# Patient Record
Sex: MALE | Race: WHITE | NOT HISPANIC OR LATINO | Employment: OTHER | URBAN - METROPOLITAN AREA
[De-identification: names, ages, dates, MRNs, and addresses within clinical notes are randomized per-mention and may not be internally consistent; named-entity substitution may affect disease eponyms.]

---

## 2020-11-02 NOTE — PROGRESS NOTES
CRS Office Visit History and Physical    Referring MD:   John Todd Md  [address]  [place] [address]    SUBJECTIVE:     Chief Complaint: Colon polyp    History of Present Illness:  The patient is new patient to this practice.   Course is as follows:  Patient is a 59 y.o. male presents for management of unresectable ascending colon polyp.  Presented initially with + Cologuard, underwent colonoscopy July 2019 which was remarkable for 3cm and 1cm ascending colon polyps, both TVAs, not removed.  Was then referred to [facility], where he has undergone 2 attempted endoscopic removals (both >3hrs per wife, both failed) on Oct 2019 and Feb 2020.  I do not yet have records from these.  He was told that pathoogy from these procedures was also benign.  Was then referred for surgery, which was cancelled because of COVID.    History of previous planned trach for sleep apnea surgery in 2000s.  History of open RYGB in 2000 (no significant complications per pt).  History of lap oneil (2005), at that procedure an 'artery was cut' and he received 6u of blood.  History of open G-tube for dysplagia related to C-spine fracture after fall.  Also takes in what sounds like full liquid diet in addition to night-time feeds.  Fall also complicated by chronic hypotension (on Midodrine), aspiration event resulting in what sounds like a code, and CKD.  Aside from above, he is relatively functional.  Enjoys gardening, needs to take breaks.  Lives with his wife of 38 years.    Last Colonoscopy:   7/1/2019  6mm polyp, sessile, transverse colon.  Removed completely w hot snare. --> Tubulovillous adenoma  30mm ascending colon polyp, sessile, biopsied and tattoed.  --> Tubulovillous adenoma, no dysplasia.  10mm ascending colon polyp. Not sampled.  Inadequate bowel prep    Family History of Colon Cancer / IBD: Multiple family members with various cancers, none with colon.    Review of patient's allergies indicates:   Allergen  "Reactions    Benefiber (wheat dextrin) [wheat dextrin] Rash       History reviewed. No pertinent past medical history.  History reviewed. No pertinent surgical history.  History reviewed. No pertinent family history.  Social History     Tobacco Use    Smoking status: Not on file   Substance Use Topics    Alcohol use: Not on file    Drug use: Not on file        Review of Systems:  Review of Systems   Gastrointestinal: Positive for constipation. Negative for abdominal pain, blood in stool and diarrhea.   All other systems reviewed and are negative.      OBJECTIVE:     Vital Signs (Most Recent)  /85 (BP Location: Left arm, Patient Position: Sitting, BP Method: Large (Automatic))   Pulse 75   Ht 6' 2" (1.88 m)   Wt 84.3 kg (185 lb 13.6 oz)   BMI 23.86 kg/m²     Physical Exam:  General: Data Unavailable male in no distress   Neuro: Alert and oriented x 4.  Moves all extremities.     HEENT: No icterus.  Trachea midline  Respiratory: Respirations are even and unlabored  Cardiac: Regular rate  Abdomen: Soft, non-distended, well-healed upper midline incision, LUQ G-tube in place.  Extremities: Warm dry and intact  Skin: No rashes      ASSESSMENT/PLAN:     58yo M w/ unresectable ascending colon tubulovillous adenoma, complex past medical and surgical history.    Will need to obtain endoscopy and pathology records from [facility].  Assuming no new findings, I would agree with proceeding with a laparoscopic, likely open, right colectomy.  We reviewed this procedure using visual aids.  We discussed risks including: anastomotic leak, abscess, wound infection, damage to bowel or ureter, bleeding, prolonged intubation, aspiration, cardiac event, brain death, or death. He will need to be seen in Anesthesia Pre-Op clinic prior to surgery.  Will also plan to obtain a CT abdomen/pelvis for surgical planning. Discussed anticipated post-op recovery. Asked if he had any additional questions, none at this time.     Negin SANCHEZ" MD Radha  Staff Surgeon  Colon & Rectal Surgery

## 2020-11-04 ENCOUNTER — OFFICE VISIT (OUTPATIENT)
Dept: SURGERY | Facility: CLINIC | Age: 60
End: 2020-11-04
Attending: COLON & RECTAL SURGERY
Payer: MEDICARE

## 2020-11-04 VITALS
SYSTOLIC BLOOD PRESSURE: 124 MMHG | HEIGHT: 74 IN | WEIGHT: 185.88 LBS | BODY MASS INDEX: 23.85 KG/M2 | DIASTOLIC BLOOD PRESSURE: 85 MMHG | HEART RATE: 75 BPM

## 2020-11-04 DIAGNOSIS — K63.5 POLYP OF COLON, UNSPECIFIED PART OF COLON, UNSPECIFIED TYPE: Primary | ICD-10-CM

## 2020-11-04 PROCEDURE — 99999 PR PBB SHADOW E&M-NEW PATIENT-LVL IV: CPT | Mod: PBBFAC,,, | Performed by: COLON & RECTAL SURGERY

## 2020-11-04 PROCEDURE — 3008F BODY MASS INDEX DOCD: CPT | Mod: CPTII,S$GLB,, | Performed by: COLON & RECTAL SURGERY

## 2020-11-04 PROCEDURE — 3008F PR BODY MASS INDEX (BMI) DOCUMENTED: ICD-10-PCS | Mod: CPTII,S$GLB,, | Performed by: COLON & RECTAL SURGERY

## 2020-11-04 PROCEDURE — 99999 PR PBB SHADOW E&M-NEW PATIENT-LVL IV: ICD-10-PCS | Mod: PBBFAC,,, | Performed by: COLON & RECTAL SURGERY

## 2020-11-04 PROCEDURE — 99205 PR OFFICE/OUTPT VISIT, NEW, LEVL V, 60-74 MIN: ICD-10-PCS | Mod: S$GLB,,, | Performed by: COLON & RECTAL SURGERY

## 2020-11-04 PROCEDURE — 99205 OFFICE O/P NEW HI 60 MIN: CPT | Mod: S$GLB,,, | Performed by: COLON & RECTAL SURGERY

## 2020-11-04 RX ORDER — ERGOCALCIFEROL (VITAMIN D2) 200 MCG/ML
8000 DROPS ORAL DAILY
COMMUNITY

## 2020-11-04 RX ORDER — CALCITRIOL 1 UG/ML
3 SOLUTION ORAL DAILY
COMMUNITY

## 2020-11-04 RX ORDER — FERROUS SULFATE 300 MG/5ML
300 LIQUID (ML) ORAL DAILY
COMMUNITY

## 2020-11-04 RX ORDER — GABAPENTIN 600 MG/1
600 TABLET ORAL 3 TIMES DAILY
COMMUNITY

## 2020-11-04 RX ORDER — FLUDROCORTISONE ACETATE 0.1 MG/1
100 TABLET ORAL DAILY
COMMUNITY

## 2020-11-04 RX ORDER — METRONIDAZOLE 500 MG/1
TABLET ORAL
Qty: 3 TABLET | Refills: 0 | Status: ON HOLD | OUTPATIENT
Start: 2020-11-04 | End: 2021-01-05 | Stop reason: HOSPADM

## 2020-11-04 RX ORDER — FERROUS SULFATE 220 (44)/5
220 SOLUTION, ORAL ORAL DAILY
COMMUNITY

## 2020-11-04 RX ORDER — MULTIVITAMIN WITH MINERALS
30 ELIXIR ORAL
COMMUNITY

## 2020-11-04 RX ORDER — AMITRIPTYLINE HYDROCHLORIDE 50 MG/1
150 TABLET, FILM COATED ORAL NIGHTLY
COMMUNITY

## 2020-11-04 RX ORDER — LEVETIRACETAM 100 MG/ML
20 SOLUTION ORAL 2 TIMES DAILY
COMMUNITY

## 2020-11-04 RX ORDER — POLYETHYLENE GLYCOL 3350 17 G/17G
POWDER, FOR SOLUTION ORAL
Qty: 238 G | Refills: 0 | Status: ON HOLD | OUTPATIENT
Start: 2020-11-04 | End: 2021-01-05 | Stop reason: HOSPADM

## 2020-11-04 RX ORDER — NEOMYCIN SULFATE 500 MG/1
TABLET ORAL
Qty: 6 TABLET | Refills: 0 | Status: ON HOLD | OUTPATIENT
Start: 2020-11-04 | End: 2021-01-05 | Stop reason: HOSPADM

## 2020-11-04 RX ORDER — OXYCODONE HCL 10 MG/1
10 TABLET, FILM COATED, EXTENDED RELEASE ORAL EVERY 12 HOURS PRN
COMMUNITY

## 2020-11-04 RX ORDER — CIMETIDINE 300 MG/1
300 TABLET, FILM COATED ORAL 2 TIMES DAILY
COMMUNITY

## 2020-11-04 RX ORDER — BACLOFEN 10 MG/1
TABLET ORAL
COMMUNITY

## 2020-11-04 NOTE — LETTER
November 4, 2020      John Todd MD  [address]  [facility] [address]           [facility]- [address]  [address]  [facility] [address]  Phone: [phone]          Patient: Ifeanyi Bunn   MR Number: [id number]   YOB: 1960   Date of Visit: 11/4/2020       Dear Dr. John Todd:    Thank you for referring Ifeanyi Bunn to me for evaluation. Attached you will find relevant portions of my assessment and plan of care.    If you have questions, please do not hesitate to call me. I look forward to following Ifeanyi Bunn along with you.    Sincerely,    Negin Garcia MD    Enclosure  CC:  No Recipients    If you would like to receive this communication electronically, please contact [email or web address] or [phone] to request more information on [employer] access.    For providers and/or their staff who would like to refer a patient to [facility], please contact us through our one-stop-shop provider referral line, [facility], at [phone].    If you feel you have received this communication in error or would no longer like to receive these types of communications, please e-mail [email or web address]

## 2020-11-10 ENCOUNTER — PATIENT MESSAGE (OUTPATIENT)
Dept: SURGERY | Facility: CLINIC | Age: 60
End: 2020-11-10

## 2020-11-10 ENCOUNTER — TELEPHONE (OUTPATIENT)
Dept: SURGERY | Facility: CLINIC | Age: 60
End: 2020-11-10

## 2020-11-12 ENCOUNTER — TELEPHONE (OUTPATIENT)
Dept: SURGERY | Facility: CLINIC | Age: 60
End: 2020-11-12

## 2020-11-12 ENCOUNTER — PATIENT MESSAGE (OUTPATIENT)
Dept: SURGERY | Facility: HOSPITAL | Age: 60
End: 2020-11-12

## 2020-11-13 ENCOUNTER — PATIENT MESSAGE (OUTPATIENT)
Dept: SURGERY | Facility: HOSPITAL | Age: 60
End: 2020-11-13

## 2020-11-18 ENCOUNTER — PATIENT MESSAGE (OUTPATIENT)
Dept: SURGERY | Facility: HOSPITAL | Age: 60
End: 2020-11-18

## 2020-11-23 ENCOUNTER — HOSPITAL ENCOUNTER (OUTPATIENT)
Dept: RADIOLOGY | Facility: HOSPITAL | Age: 60
Discharge: HOME | End: 2020-11-23
Attending: COLON & RECTAL SURGERY
Payer: MEDICARE

## 2020-11-23 ENCOUNTER — HOSPITAL ENCOUNTER (OUTPATIENT)
Dept: PREADMISSION TESTING | Facility: HOSPITAL | Age: 60
Discharge: HOME | End: 2020-11-23
Attending: ANESTHESIOLOGY
Payer: MEDICARE

## 2020-11-23 ENCOUNTER — HOSPITAL ENCOUNTER (OUTPATIENT)
Dept: CARDIOLOGY | Facility: CLINIC | Age: 60
Discharge: HOME | End: 2020-11-23
Payer: MEDICARE

## 2020-11-23 ENCOUNTER — PATIENT MESSAGE (OUTPATIENT)
Dept: SURGERY | Facility: HOSPITAL | Age: 60
End: 2020-11-23

## 2020-11-23 ENCOUNTER — INITIAL CONSULT (OUTPATIENT)
Dept: INTERNAL MEDICINE | Facility: CLINIC | Age: 60
End: 2020-11-23
Payer: MEDICARE

## 2020-11-23 ENCOUNTER — ANESTHESIA EVENT (OUTPATIENT)
Dept: SURGERY | Facility: HOSPITAL | Age: 60
DRG: 331 | End: 2020-11-23
Payer: MEDICARE

## 2020-11-23 VITALS
HEIGHT: 74 IN | DIASTOLIC BLOOD PRESSURE: 90 MMHG | SYSTOLIC BLOOD PRESSURE: 178 MMHG | OXYGEN SATURATION: 97 % | HEART RATE: 70 BPM | RESPIRATION RATE: 16 BRPM | BODY MASS INDEX: 24.26 KG/M2 | TEMPERATURE: 98 F | WEIGHT: 189 LBS

## 2020-11-23 DIAGNOSIS — G40.909 SEIZURE DISORDER: ICD-10-CM

## 2020-11-23 DIAGNOSIS — R26.89 BALANCE PROBLEM: ICD-10-CM

## 2020-11-23 DIAGNOSIS — Z98.890 H/O TRACHEOSTOMY: ICD-10-CM

## 2020-11-23 DIAGNOSIS — R41.0 CONFUSION: ICD-10-CM

## 2020-11-23 DIAGNOSIS — D64.9 ANEMIA, UNSPECIFIED TYPE: ICD-10-CM

## 2020-11-23 DIAGNOSIS — N18.31 STAGE 3A CHRONIC KIDNEY DISEASE: ICD-10-CM

## 2020-11-23 DIAGNOSIS — Z93.1 PEG (PERCUTANEOUS ENDOSCOPIC GASTROSTOMY) STATUS: ICD-10-CM

## 2020-11-23 DIAGNOSIS — G89.29 OTHER CHRONIC PAIN: ICD-10-CM

## 2020-11-23 DIAGNOSIS — Z86.69 HISTORY OF SLEEP APNEA: ICD-10-CM

## 2020-11-23 DIAGNOSIS — K63.5 POLYP OF COLON, UNSPECIFIED PART OF COLON, UNSPECIFIED TYPE: ICD-10-CM

## 2020-11-23 DIAGNOSIS — Z01.818 PREOPERATIVE TESTING: ICD-10-CM

## 2020-11-23 DIAGNOSIS — Z90.49 HISTORY OF CHOLECYSTECTOMY: ICD-10-CM

## 2020-11-23 DIAGNOSIS — F11.90 CHRONIC, CONTINUOUS USE OF OPIOIDS: ICD-10-CM

## 2020-11-23 DIAGNOSIS — Z82.49 FAMILY HISTORY OF HEART DISEASE: ICD-10-CM

## 2020-11-23 DIAGNOSIS — Z86.73 HISTORY OF STROKE: ICD-10-CM

## 2020-11-23 DIAGNOSIS — F32.A DEPRESSION, UNSPECIFIED DEPRESSION TYPE: ICD-10-CM

## 2020-11-23 DIAGNOSIS — Z98.890 H/O CERVICAL SPINE SURGERY: ICD-10-CM

## 2020-11-23 DIAGNOSIS — R13.10 DYSPHAGIA, UNSPECIFIED TYPE: ICD-10-CM

## 2020-11-23 DIAGNOSIS — I10 ESSENTIAL HYPERTENSION: ICD-10-CM

## 2020-11-23 DIAGNOSIS — Z86.14 HISTORY OF MRSA INFECTION: ICD-10-CM

## 2020-11-23 DIAGNOSIS — Z98.84 HISTORY OF ROUX-EN-Y GASTRIC BYPASS: ICD-10-CM

## 2020-11-23 PROBLEM — N18.30 STAGE 3 CHRONIC KIDNEY DISEASE: Status: ACTIVE | Noted: 2019-08-28

## 2020-11-23 PROBLEM — S12.9XXA: Status: ACTIVE | Noted: 2018-01-29

## 2020-11-23 PROCEDURE — 74177 CT ABD & PELVIS W/CONTRAST: CPT | Mod: 26,,, | Performed by: RADIOLOGY

## 2020-11-23 PROCEDURE — 99204 PR OFFICE/OUTPT VISIT, NEW, LEVL IV, 45-59 MIN: ICD-10-PCS | Mod: S$GLB,,, | Performed by: HOSPITALIST

## 2020-11-23 PROCEDURE — 74177 CT ABD & PELVIS W/CONTRAST: CPT | Mod: TC

## 2020-11-23 PROCEDURE — 93010 EKG 12-LEAD: ICD-10-PCS | Mod: S$GLB,,, | Performed by: INTERNAL MEDICINE

## 2020-11-23 PROCEDURE — 99999 PR PBB SHADOW E&M-EST. PATIENT-LVL III: ICD-10-PCS | Mod: PBBFAC,,, | Performed by: HOSPITALIST

## 2020-11-23 PROCEDURE — 99204 OFFICE O/P NEW MOD 45 MIN: CPT | Mod: S$GLB,,, | Performed by: HOSPITALIST

## 2020-11-23 PROCEDURE — 74177 CT ABDOMEN PELVIS WITH CONTRAST: ICD-10-PCS | Mod: 26,,, | Performed by: RADIOLOGY

## 2020-11-23 PROCEDURE — 93010 ELECTROCARDIOGRAM REPORT: CPT | Mod: S$GLB,,, | Performed by: INTERNAL MEDICINE

## 2020-11-23 PROCEDURE — 93005 EKG 12-LEAD: ICD-10-PCS | Mod: S$GLB,,, | Performed by: ANESTHESIOLOGY

## 2020-11-23 PROCEDURE — 25500020 PHARM REV CODE 255: Performed by: COLON & RECTAL SURGERY

## 2020-11-23 PROCEDURE — 93005 ELECTROCARDIOGRAM TRACING: CPT | Mod: S$GLB,,, | Performed by: ANESTHESIOLOGY

## 2020-11-23 PROCEDURE — 99999 PR PBB SHADOW E&M-EST. PATIENT-LVL III: CPT | Mod: PBBFAC,,, | Performed by: HOSPITALIST

## 2020-11-23 RX ORDER — MUPIROCIN 20 MG/G
OINTMENT TOPICAL 2 TIMES DAILY
Qty: 30 G | Refills: 0 | Status: SHIPPED | OUTPATIENT
Start: 2020-12-01 | End: 2020-11-23 | Stop reason: SDUPTHER

## 2020-11-23 RX ORDER — MUPIROCIN 20 MG/G
OINTMENT TOPICAL 2 TIMES DAILY
Qty: 22 G | Refills: 0 | Status: SHIPPED | OUTPATIENT
Start: 2020-12-01 | End: 2020-12-06

## 2020-11-23 RX ORDER — MIDODRINE HYDROCHLORIDE 5 MG/1
10 TABLET ORAL DAILY PRN
COMMUNITY

## 2020-11-23 RX ORDER — CALCIUM CARBONATE 300MG(750)
TABLET,CHEWABLE ORAL
COMMUNITY

## 2020-11-23 RX ORDER — FLUOXETINE 20 MG/5ML
60 SOLUTION ORAL DAILY
COMMUNITY
Start: 2020-09-09

## 2020-11-23 RX ADMIN — IOHEXOL 100 ML: 350 INJECTION, SOLUTION INTRAVENOUS at 01:11

## 2020-11-23 NOTE — ASSESSMENT & PLAN NOTE
Getting nutrition mostly  through the feeding tube   Formula- vital 1.5  Gets Medication through the feeding tube

## 2020-11-23 NOTE — OUTPATIENT SUBJECTIVE & OBJECTIVE
"Outpatient Subjective & Objective     Chief complaint-Preoperative evaluation, Perioperative Medical management, complication reduction plan     Active cardiac conditions- none    Revised cardiac risk index predictors- history of cerebrovascular disease    Functional capacity -Examples of physical activity, tries to stay active , works some in the garden,   can take a flight of stairs holding on to the railing----- He can undertake all the above activities without  chest pain,chest tightness, Shortness of breath , making his exercise tolerance more, than 4 Mets.       Review of Systems   Constitutional: Negative for chills and fever.        No unusual weight changes     HENT:        STOPBANG score  5/ 8      Napping during the day   Witnessed stopping of breathing   Age over 50 years  Neck size over 40 CM  Male gender   Eyes:        No unusual vision changes   Respiratory:        No cough , phlegm    No Hemoptysis   Cardiovascular:        As noted   Gastrointestinal:        Bowels- constipated  No overt upperGI/ blood losses   Endocrine:        Prednisone use > 20 mg daily for 3 weeks- none    Genitourinary: Negative for dysuria.        No urinary hesitancy    Musculoskeletal:        As above      Skin: Negative for rash.   Neurological: Negative for syncope.        No unilateral weakness   Hematological:        Current use of Anticoagulants  None    Psychiatric/Behavioral:          No SI/HI     No vascular stenting     No past medical history pertinent negatives.  No family history on file.  No past surgical history on file.    No anesthesia, bleeding, cardiac problems, PONV with previous surgeries/procedures.  Medications and Allergies reviewed in epic.   FH- No bleeding / venous thrombosis ,  disease in family     Physical Exam  Blood pressure (!) 178/90, pulse 70, temperature 98.1 °F (36.7 °C), temperature source Oral, resp. rate 16, height 6' 2" (1.88 m), weight 85.7 kg (189 lb), SpO2 97 %.  Suggested " watching BP  Discussed fall precautions     Physical Exam  Constitutional- Vitals - Body mass index is 24.27 kg/m².,   Vitals:    11/23/20 1345   BP: (!) 178/90   Pulse: 70   Resp:    Temp:      General appearance-Conscious,Coherent  Eyes- No conjunctival icterus,pupils  round  and reactive to light   ENT-Oral cavity- moist  , Hearing grossly normal   Neck- No thyromegaly ,Trachea -central, No jugular venous distension,   No Carotid Bruit   Cardiovascular -Heart Sounds- Normal  and  no murmur   , No gallop rhythm   Respiratory - Normal Respiratory Effort, Normal breath sounds,  no wheeze  and  no forced expiratory wheeze    Peripheral pitting pedal edema-- none , no calf pain   Gastrointestinal -Soft abdomen, No palpable masses, Non Tender,Liver,Spleen not palpable. No-- free fluid and shifting dullness  Musculoskeletal- No finger Clubbing. Strength grossly normal   Lymphatic-No Palpable cervical, axillary,Inguinal lymphadenopathy   Psychiatric - normal effect,Orientation  Rt Dorsalis pedis pulses-palpable    Lt Dorsalis pedis pulses- palpable   Rt Posterior tibial pulses -palpable   Left posterior tibial pulses -palpable   Miscellaneous -  no asterixis,  Surgical scarneck,, abdomen  and  no renal bruit  Investigations  Lab and Imaging have been reviewed in epic.    Review of Medicine tests        Review of clinical lab tests:  Lab Results   Component Value Date    CREATININE 1.6 (H) 11/04/2020    HGB 10.6 (L) 11/04/2020     11/04/2020             Review of old records- Was done and information gathered regards to events leading to surgery and health conditions of significance in the perioperative period.    Outpatient Subjective & Objective

## 2020-11-23 NOTE — ASSESSMENT & PLAN NOTE
Presented initially with + Cologuard, underwent colonoscopy July 2019 which was remarkable for 3cm and 1cm ascending colon polyps, both TVAs, not removed.  Was then referred to [facility], where he has undergone 2 attempted endoscopic removals (both >3hrs per wife, both failed) on Oct 2019 and Feb 2020.  I do not yet have records from these.  He was told that pathoogy from these procedures was also benign.  Was then referred for surgery, which was cancelled because of COVID.     History of previous planned trach for sleep apnea surgery in 2000s.  History of open RYGB in 2000 (no significant complications per pt).  History of lap oneil (2005), at that procedure an 'artery was cut' and he received 6u of blood.  History of open G-tube for dysplagia related to C-spine fracture after fall.  Also takes in what sounds like full liquid diet in addition to night-time feeds.  Fall also complicated by chronic hypotension (on Midodrine), aspiration event resulting in what sounds like a code, and CKD

## 2020-11-23 NOTE — ASSESSMENT & PLAN NOTE
Has a history HTN in the past many years ago before the weight reduction surgery in 1995   Today's BP is high - attributes to pain , had Difficulty IV access today     Home BP readings -80/50- Resting BP- 110/70   On Midodrine to bringing up the BP  Low BP attributed to spinal cord injury       Hypertension-  Usual  Blood pressure is acceptable . I suggest  blood pressure monitoring .I suggest addressing pain control as uncontrolled pain can increased blood pressure
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
## 2020-11-23 NOTE — OUTPATIENT SUBJECTIVE & OBJECTIVE
"Outpatient Subjective & Objective     Chief complaint-Preoperative evaluation, Perioperative Medical management, complication reduction plan     Active cardiac conditions- {[facility] Active Cardiac Conditions:33248}    Revised cardiac risk index predictors- {[facility] Revised Cardiac Risk Index Predictors:72545}    Functional capacity -Examples of physical activity  {[facility] Functional Capacity:64993}----- He can undertake all the above activities without  chest pain,chest tightness, Shortness of breath ,dizziness,lightheadedness making his exercise tolerance more, less  than 4 Mets.   Winded on exertion, not new ,stable over time     Review of Systems    No past medical history pertinent negatives.  No family history on file.  No past surgical history on file.    No anesthesia, bleeding, cardiac problems with previous surgeries/procedures.  Medications and Allergies reviewed in epic.     Physical Exam  Blood pressure (!) 178/90, pulse 70, temperature 98.1 °F (36.7 °C), temperature source Oral, resp. rate 16, height 6' 2" (1.88 m), weight 85.7 kg (189 lb), SpO2 97 %.      Investigations  Lab and Imaging have been reviewed in epic.      Review of old records- Was done and information gathered regards to events leading to surgery and health conditions of significance in the perioperative period.    Outpatient Subjective & Objective   "
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
## 2020-11-23 NOTE — ASSESSMENT & PLAN NOTE
Since 2014     Chronic continuous opioid use- In view of the opioid use, the patient may have opioid tolerance .  I suggest considering the possibility of opioid tolerance  in planning post operative pain control     In preparation for surgery , discussed possibly reducing opioid use , to help reduce opioid tolerance to help post op pain control   No withdrawal problems    They are willing to reduce it

## 2020-11-23 NOTE — PROGRESS NOTES
[facility]  Progress Note    Patient Name: Ifeanyi Bunn  MRN: [id number]  Date of Evaluation- 12/28/2020  PCP- Malachi Jameson MD    Future cases for Ifeanyi Bunn [[id number]]     Case ID Status Date Time Adrián Procedure Provider Location    [id number] [facility] 12/3/2020 12:00  COLECTOMY, RIGHT, LAPAROSCOPIC, ERAS low Negin Garcia MD [[id number]] N[facility] OR 2ND FLR          HPI:  History of present illness- I had the pleasure of meeting this pleasant 59 y.o. gentleman in the pre op clinic prior to his elective Abdominal surgery. The patient is new to me . Ifeanyi was accompanied by wife Ms Torres.    I have obtained the history by speaking to the patient and by reviewing the electronic health records.    Events leading up to surgery / History of presenting illness -    Polyp of colon    Presented initially with positive  Cologuard testing for anemia , underwent colonoscopy July 2019 which was remarkable for  ascending colon polyps    Was then referred to [facility], where he has undergone 2 attempted endoscopic removals unsuccessfully  on Oct 2019 and Feb 2020.  In between those attempts he was considered for surgery, surgery was put off  because of the his health    He was due to have surgery , but COVID delayed his surgery .     No pain from this    Has long standing constipation for which he takes Miralax    He had a colonoscopy at about 48 , but had not had one until the recent  Ones in 2019- 2020 - due to neck injury     Relevant health conditions of significance for the perioperative period/ History of presenting illness -    Was well until he had an accident on the job in Feb 2014 - Was up a scaffolding and fell down as the scaffolding came apart resulting in Cervical spine injury     He tries to stay active     Health conditions of significance for the perioperative period     - Anemia    - Hypotension     - Bienvenido en Y     - CKD3     Lives with wife in [place]  Wife had liver transplant at [facility]  Single level  house   Wife can help    Not known to have heart disease  Lung disease       Subjective/ Objective:     Chief complaint-Preoperative evaluation, Perioperative Medical management, complication reduction plan     Active cardiac conditions- none    Revised cardiac risk index predictors- history of cerebrovascular disease    Functional capacity -Examples of physical activity, tries to stay active , works some in the garden,   can take a flight of stairs holding on to the railing----- He can undertake all the above activities without  chest pain,chest tightness, Shortness of breath , making his exercise tolerance more, than 4 Mets.       Review of Systems   Constitutional: Negative for chills and fever.        No unusual weight changes     HENT:        STOPBANG score  5/ 8      Napping during the day   Witnessed stopping of breathing   Age over 50 years  Neck size over 40 CM  Male gender   Eyes:        No unusual vision changes   Respiratory:        No cough , phlegm    No Hemoptysis   Cardiovascular:        As noted   Gastrointestinal:        Bowels- constipated  No overt upperGI/ blood losses   Endocrine:        Prednisone use > 20 mg daily for 3 weeks- none    Genitourinary: Negative for dysuria.        No urinary hesitancy    Musculoskeletal:        As above      Skin: Negative for rash.   Neurological: Negative for syncope.        No unilateral weakness   Hematological:        Current use of Anticoagulants  None    Psychiatric/Behavioral:          No SI/HI     No vascular stenting     No past medical history pertinent negatives.  No family history on file.  No past surgical history on file.    No anesthesia, bleeding, cardiac problems, PONV with previous surgeries/procedures.  Medications and Allergies reviewed in epic.   FH- No bleeding / venous thrombosis ,  disease in family     Physical Exam  Blood pressure (!) 178/90, pulse 70, temperature 98.1 °F (36.7 °C), temperature source Oral, resp. rate 16, height 6'  "2" (1.88 m), weight 85.7 kg (189 lb), SpO2 97 %.  Suggested watching BP  Discussed fall precautions     Physical Exam  Constitutional- Vitals - Body mass index is 24.27 kg/m².,   Vitals:    11/23/20 1345   BP: (!) 178/90   Pulse: 70   Resp:    Temp:      General appearance-Conscious,Coherent  Eyes- No conjunctival icterus,pupils  round  and reactive to light   ENT-Oral cavity- moist  , Hearing grossly normal   Neck- No thyromegaly ,Trachea -central, No jugular venous distension,   No Carotid Bruit   Cardiovascular -Heart Sounds- Normal  and  no murmur   , No gallop rhythm   Respiratory - Normal Respiratory Effort, Normal breath sounds,  no wheeze  and  no forced expiratory wheeze    Peripheral pitting pedal edema-- none , no calf pain   Gastrointestinal -Soft abdomen, No palpable masses, Non Tender,Liver,Spleen not palpable. No-- free fluid and shifting dullness  Musculoskeletal- No finger Clubbing. Strength grossly normal   Lymphatic-No Palpable cervical, axillary,Inguinal lymphadenopathy   Psychiatric - normal effect,Orientation  Rt Dorsalis pedis pulses-palpable    Lt Dorsalis pedis pulses- palpable   Rt Posterior tibial pulses -palpable   Left posterior tibial pulses -palpable   Miscellaneous -  no asterixis,  Surgical scarneck,, abdomen  and  no renal bruit  Investigations  Lab and Imaging have been reviewed in [facility].    Review of Medicine tests        Review of clinical lab tests:  Lab Results   Component Value Date    CREATININE 1.6 (H) 11/04/2020    HGB 10.6 (L) 11/04/2020     11/04/2020             Review of old records- Was done and information gathered regards to events leading to surgery and health conditions of significance in the perioperative period.        Preoperative cardiac risk assessment-  The patient does not have any active cardiac conditions . Revised cardiac risk index predictors- 1---.Functional capacity is more than 4 Mets. He will be undergoing a Abdominal procedure that carries a " Moderate Risk risk ( High , if open )     Risk of a major Cardiac event ( Defined as death, myocardial infarction, or cardiac arrest at 30 days after noncardiac surgery), based on RCRI score     -6.0% , if laparoscopic     10.1% , if open     No further cardiac work up is indicated prior to proceeding with the surgery     Orders Placed This Encounter    mupirocin (BACTROBAN) 2 % ointment       American Society of Anesthesiologists Physical status classification ( ASA ) class: 3     Postoperative pulmonary complication risk assessment:      ARISCAT ( Canet) risk index- risk class -  Low, if duration of surgery is under 3 hours, intermediate, if duration of surgery is over 3 hours      Gigi Respiratory failure index- percentage risk of respiratory failure: 1.8 %     Assessment/Plan:     Essential hypertension  Has a history HTN in the past many years ago before the weight reduction surgery in 1995   Today's BP is high - attributes to pain , had Difficulty IV access today     Home BP readings -80/50- Resting BP- 110/70   On Midodrine to bringing up the BP  Low BP attributed to spinal cord injury       Hypertension-  Usual  Blood pressure is acceptable . I suggest  blood pressure monitoring .I suggest addressing pain control as uncontrolled pain can increased blood pressure     Chronic pain  Taking Elavil- For long standing headache from an injury 1992 , to help with sleep, depression  Had construction related injury in 1992  Long standing Neck pain      Elavil helping   Has dry mouth   On Oxycodone              Anemia  Please see under HPI  Most recent Hb about 10     Passes black stool for several years   Not known to have stomach ulcers     He has a history of Bienvenido en y surgery   -  He reports having black stool ( Not in Iron )   He is not  known to have peptic ulcer although he is at risk , given the Bienvenido en Y     Suggested follow up for possible upper GI bleeding    Reports passing black stool for several  years   I wonder, if there is an element of Upper GI bleeding in his anemia ( Does not seem acute )     Most recent Hb > 10           Dysphagia  Since had the Cervical spine injury in 2014   Had hard ware placement in 2014 for the injury  Had hardware removal about 6 months later which did not help     Has problem drinking water- can drink small amounts    On Soft food     On soft consistency diet and on thin liquids    Has choking   Had aspiration about 2016   Had to be resuscitated  Aspirated again a few months ago       Dysphagia- I suggest providing soft diet or other wise directed , in view of the preexisting dysphagia as medication used in the perioperative period can possibly increase the dysphagia. I suggest aspiration precautions     The likely cause of his dysphagia is C spine related       '     H/O cervical spine surgery  2014  Fusion C3- C6  No longer has hard ware   Has some limitation of cervical spine movement      I suggest that the perioperative team be aware of this so that appropriate preventive care can be taken     Chronic, continuous use of opioids  Since 2014     Chronic continuous opioid use- In view of the opioid use, the patient may have opioid tolerance .  I suggest considering the possibility of opioid tolerance  in planning post operative pain control     In preparation for surgery , discussed possibly reducing opioid use , to help reduce opioid tolerance to help post op pain control   No withdrawal problems    They are willing to reduce it     Stage 3 chronic kidney disease  Creatinine from 11/4/2020 -  1.6   Under Nephrology care   As per wife , can proceed with surgery     Low volume from reduced oral intake , Reduced tolerance to PEG feeds could be contributing    Not on Chronic NSASID    Contributors - Low BP     Takes Florinef to help reatinb food   Not known to have adrenal , pituitary problems     Stages of CKD discussed  Deleterious effects NSAID's , Beneficial effects of  Hydration discussed   Tylenol as needed for pain     I  suggest monitoring renal function, in put and out put status nicole-operatively. I  suggest avoiding nephrotoxic medication including NSAIDs, COX2 inhibitors, intravenous contrast agent,avoiding hypotension to prevent further renal impairment.     Had IV contrast today   Suggested to keep him hydrated     PEG (percutaneous endoscopic gastrostomy) status  Getting nutrition mostly  through the feeding tube   Formula- vital 1.5  Gets Medication through the feeding tube     History of Bienvenido-en-Y gastric bypass  1995 for weight reduction   Was 335 # prior to surgery    No longer a Diabetic    On Tagament     On B12 Supplementation      Weight fairly stable     Not known to have peptic ulcer   Reports having black stool - Not in Iron     Suggested follow up for possible upper GI bleeding        Suggest Care with NSAID Medication     Seizure disorder  Since the spinal cord injury in 2014   None recently   Keppra helping     Suggested avoidance of Tramadol as it can lower seizure threshold          Depression  Doing fairly well  I suggest monitoring the sodium as SIADH from Fluoxetine   use and hypersecretion of ADH associated with surgery can reduce sodium in the perioperative period  Mostly passes clearl urine     Colon polyp  For surgery     Confusion  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  Has memory problems   He is functional   Laid marbella a few weeks ago      Balance problem  From Low BP  Gets dizzy on position changes   Suggested to change positions gradually and to stay hydrated     History of sleep apnea  Had UPPP surgery about 1992     As per wife , still using CPAP to help with his  Breathing from his brain injury since the accident in 2014     History of stroke  Had an MRI of the brain for confusion Sept 2020   MRI showed stroke   No unilateral weakness   Under Neurology care , planning on doing further work up - may not happen before surgery     No arrhythmia  problems, Carotid disease     ? Small stroke  As per wife - not too concerning    Hypotension could be contributing    Not on satin, statin        History of cholecystectomy  As per wife , had an arterial injury that was attributed to scar tissue from gastric bypass    Required 8 units of transfusion    Family history of heart disease  Both parents had heart disease  No personal history of heart disease or suggestions of symptomatic Heart disease with fairly good functional capacity until the spine injury in 2014     History of MRSA infection  In the setting of a complicated Gall bladder removal      Patient has a reported history of Staph infection   In an attempt to prevent Surgical site infection , with the up coming surgery , suggest the following      Previous MRSA infection -     Intra nasal Bactroban for 5 days ( 2 days pre op and 3  days post op )   IV vancomycin ( If no allergy / Intolerance to Vancomycin ) for surgeries that require systemic antibiotic to prevent surgical site infections      Instructions for use of  nasal Bactroban discussed     Generic ointment or generic cream (30gram tube) - place a drop on a q tip , insert into to very tip of the nose only , then press on the nose gently to distribute.       Hibiclens discussed - Neck down           H/O tracheostomy   I suggest that the perioperative team be aware of this so that appropriate preventive care can be taken         Preventive perioperative care    Thromboembolic prophylaxis:  His risk factors for thrombosis include surgical procedure and age.I suggest  thromboembolic prophylaxis ( mechanical/pharmacological, weighing the risk benefits of pharmacological agent use considering nicole procedural bleeding )  during the perioperative period.I suggested being active in the post operative period.      Postoperative pulmonary complication prophylaxis-Risk factors for post operative pulmonary complications include sleep apnea  ASA class >2 and  proximity of the surgical site to the lungs- I suggest incentive spirometry use, early ambulation, end tidal carbon dioxide monitoring and pain control so as to avoid diaphragmatic splinting  , oral care , head end of bed elevation      Renal complication prophylaxis-Risk factors for renal complications include pre-existing renal disease . I suggest keeping him well hydrated and avoidance/ minimizing the use of  NSAID's,UNDERWOOD 2 Inhibitors ,IV contrast if possible in the perioperative period.      Surgical site Infection Prophylaxis-I  suggest appropriate antibiotic for Prophylaxis against Surgical site infections       Delirium prophylaxis-Risk factors - opioid use - I suggest avoidance / minimizing the use of  Benzodiazepines ( unless the patient has been taking it on a regular basis ),Anticholinergic medication,Antihistamines ( like  Benadryl).I suggest minimizing the use of opioid medication and use of IV tylenol,if it is appropriate. I suggest using the lowest possible dose of opioids for the shortest duration possible in the perioperative period. I suggest to Keep shades/blinds open during the day, lights off and shades closed at night to encourage normal sleep/wake cycle.I encourage the presence of the family member with the patient at all times, if at all possible as mental status changes can be picked up early by the family members and they help with reorientation. I encouraged the presence of family to help with orientation in the perioperative period. Benadryl avoidance suggested      This visit was focused on Preoperative evaluation, Perioperative Medical management, complication reduction plans. I suggest that the patient follows up with primary care or relevant sub specialists for ongoing health care.    I appreciate the opportunity to be involved in this patients care. Please feel free to contact me if there were any questions about this consultation.    Patient is optimized     Patient  was instructed  to call and update me about any changes to health,  medication, office visits ,testing out side of the [facility] operative care center , hospitalizations between now and surgery     Shayna Hager MD  Perioperative Medicine  [facility]   Pager [phone]    COVID screening     No fever   No cough   No SOB  No sore throat   No loss of taste or smell   No muscle aches   No nausea, vomiting , diarrhea  --  11/24/2020 - 14 43     EKG 11/23/2020 personally reviewed reportedly showed    Baseline Artifact   Normal sinus rhythm   Normal ECG   No previous ECGs available     Called to discuss Bactroban usage instructions  Left a message with the usage instructions  Call, if needed     --  11/30- 15 08     Nephrology records reviewed   Note from Nov 2020     Evidence of CKD from hypertensive nephrosclerosis. However, has been mild.   Now renal function worse over the last several months and suspect pre-renal from hypotension and dehydration.   Labs 5 Sept 2017 showed BUN/SCr 14/1.59 with eGFr 48ml/min/1.73m2 and 24H urine with CrCl 41ml/min. Renal U/S unremarkable. A FEna was less than 1% which goes along with pre-renal etiology. On midodrine and now fludrocortisone. Labs May 2018 showed BUN/SCr 34/1.9 with eGFr 39ml/min/1.73m2. Labs Aug 2018 BUN/SCr 22/1.76 with eGFr 42ml/min/1.73m2. Labs Nov 2018 BUN/SCr 21/1.76 with eGFr 42ml/min/1.73m2. Labs better Feb 2019 with BUN/SCr 18/1.28 with eGFr 61ml/min/1.73m2. Labs Aug 2019 BUN/SCr 25/1.49 with eGFr 51ml/min/1.73m2. Labs Feb 2020 BUN/SCr 20/1.4 with eGFr 55ml/min/1.73m2. Worse June 2020 BUN/SCr 33/2.8 and suspect pre-renal. Labs 19 Aug 2020 BUN/SCr 31/2.3 with eGFr 31ml/min/1.73m2. Ensure adequate hydration    Dysautonomia and low BP and is on midodrine. I also started him on fludrocortisone for this   Baseline SCr 1 to 1.1    GI records from Aug 2020 reviewed   As per note- He denies melena and hematochezia. He receives all of his nutrition through a gastrostomy  tube due to chronic oropharyngeal dysphagia    Called to follow up on his BP  Left a message to update about his BP  Call, if needed   -  11/30- 15 44     Requested Neurology records   -  12/3- 16 47   Spoke to wife   Surgery postponed to 12/29   Will review Neurology records  -  12/14- 16 10     Neurology records reviewed   Note from Nov 2020 - area in the brain stem suggestive of previous hemorrhage     Spoke to wife about the brain stem hemorrhage   Had PCP evaluation last week   As per her understanding - hemorrhage was attributed to the trauma 2014  , not new     Plan to use Midodrine for low BP, use     As per wife , she discussed with Neurologist about up coming surgery - suggested working with Neurologist about the brain stem finding in the setting of him requiring intubation       BP lately 135-110/ 56-60-78  No other changes   Bactroban discussed   --  12/16- 16 19     Called to follow up   Spoke to her   Requested her to update me about the Neurologist's input   --  12/22/2020 - 12 20     Patient portal Message from wife - dated 12/21  Dr. Macario says Sidneys bleed ( In the brain stem )  is old so he feels its ok to proceed with surgery.   --  12/23- 17 11    Called and spoke to her   As per her - Neurologist is fine -as above   As per her - Plan is for change of CPAP to BIPAP  Brain related breathing problems    No new changes to his breathing  Had surgery for sleep apnea  BP - no recent problems   140-150/56-60-78  Had intubation in the past with no problems  Hydration discussed   Had renal function test since IV contrast- no problems per her   Had Nephrologist, PCP, Neurology  - acceptance  surgery       Called to follow up , spoke to her  to address any concerns with the up coming surgery or any questions on Medication instructions -  Doing well ,No changes to Medication, Health-   --  12/28/2020     Called to follow up , spoke to wife  to address any concerns with the up coming surgery or any  questions on Medication instructions -  Discussed  changes to Medication-  Currently staying at [facility] in preparation for surgery   Had to evacuate this AM at about 7525-3178 for smoke alarm- came down the steps   Fell down   No visible bleeding , bruising   No nausea, vomiting , blurred vision, one sided weakness - suggested watching out - suggested discussing with Surgery team   Call, 911, if unwell at any time   Has G tube   Using Nasal Bactroban   As per wife,  he is doing good   Hydrating   Passing urine -light colored   Suggested clarifying Keppra dose

## 2020-11-23 NOTE — LETTER
November 23, 2020      Negin Garcia MD  [address] [facility] [address]           [facility]  [address] [facility] [address]  Phone: [phone]          Patient: Ifeaniy Bunn   MR Number: [id number]   YOB: 1960   Date of Visit: 11/23/2020       Dear Dr. Negin Garcia:    Thank you for referring Ifeanyi Bunn to me for evaluation. Attached you will find relevant portions of my assessment and plan of care.    If you have questions, please do not hesitate to call me. I look forward to following Ifeanyi Bunn along with you.    Sincerely,    Shayna Hager MD    Enclosure  CC:  No Recipients    If you would like to receive this communication electronically, please contact [email or web address] or [phone] to request more information on [employer] [employer] access.    For providers and/or their staff who would like to refer a patient to [facility], please contact us through our one-stop-shop provider referral line, [facility], at [phone].    If you feel you have received this communication in error or would no longer like to receive these types of communications, please e-mail [email or web address]

## 2020-11-23 NOTE — ASSESSMENT & PLAN NOTE
Taking Elavil- For long standing headache from an injury 1992 , to help with sleep, depression  Had construction related injury in 1992  Long standing Neck pain      Elavil helping   Has dry mouth   On Oxycodone

## 2020-11-23 NOTE — ASSESSMENT & PLAN NOTE
In the setting of a complicated Gall bladder removal      Patient has a reported history of Staph infection   In an attempt to prevent Surgical site infection , with the up coming surgery , suggest the following      Previous MRSA infection -     Intra nasal Bactroban for 5 days ( 2 days pre op and 3  days post op )   IV vancomycin ( If no allergy / Intolerance to Vancomycin ) for surgeries that require systemic antibiotic to prevent surgical site infections      Instructions for use of  nasal Bactroban discussed     Generic ointment or generic cream (30gram tube) - place a drop on a q tip , insert into to very tip of the nose only , then press on the nose gently to distribute.       Hibiclens discussed - Neck down

## 2020-11-23 NOTE — ASSESSMENT & PLAN NOTE
Since the spinal cord injury in 2014   None recently   Keppra helping     Suggested avoidance of Tramadol as it can lower seizure threshold

## 2020-11-23 NOTE — ASSESSMENT & PLAN NOTE
Doing fairly well  I suggest monitoring the sodium as SIADH from Fluoxetine   use and hypersecretion of ADH associated with surgery can reduce sodium in the perioperative period  Mostly passes clearl urine

## 2020-11-23 NOTE — ASSESSMENT & PLAN NOTE
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

## 2020-11-23 NOTE — ASSESSMENT & PLAN NOTE
From Low BP  Gets dizzy on position changes   Suggested to change positions gradually and to stay hydrated

## 2020-11-23 NOTE — ASSESSMENT & PLAN NOTE
1995 for weight reduction   Was 335 # prior to surgery    No longer a Diabetic    On Tagament     On B12 Supplementation      Weight fairly stable     Not known to have peptic ulcer   Reports having black stool - Not in Iron     Suggested follow up for possible upper GI bleeding        Suggest Care with NSAID Medication

## 2020-11-23 NOTE — ASSESSMENT & PLAN NOTE
Had an MRI of the brain for confusion Sept 2020   MRI showed stroke   No unilateral weakness   Under Neurology care , planning on doing further work up - may not happen before surgery     No arrhythmia problems, Carotid disease     ? Small stroke  As per wife - not too concerning    Hypotension could be contributing    Not on satin, statin

## 2020-11-23 NOTE — HPI
History of present illness- I had the pleasure of meeting this pleasant 59 y.o. gentleman in the pre op clinic prior to his elective Abdominal surgery. The patient is new to me . Ifeanyi was accompanied by wife Ms Torres.    I have obtained the history by speaking to the patient and by reviewing the electronic health records.    Events leading up to surgery / History of presenting illness -    Polyp of colon    Presented initially with positive  Cologuard testing for anemia , underwent colonoscopy July 2019 which was remarkable for  ascending colon polyps    Was then referred to [facility], where he has undergone 2 attempted endoscopic removals unsuccessfully  on Oct 2019 and Feb 2020.  In between those attempts he was considered for surgery, surgery was put off  because of the his health    He was due to have surgery , but COVID delayed his surgery .     No pain from this    Has long standing constipation for which he takes Miralax    He had a colonoscopy at about 48 , but had not had one until the recent  Ones in 2019- 2020 - due to neck injury     Relevant health conditions of significance for the perioperative period/ History of presenting illness -    Was well until he had an accident on the job in Feb 2014 - Was up a scaffolding and fell down as the scaffolding came apart resulting in Cervical spine injury     He tries to stay active     Health conditions of significance for the perioperative period     - Anemia    - Hypotension     - Bienvenido en Y     - CKD3     Lives with wife in [place]  Wife had liver transplant at [facility]  Single level house   Wife can help    Not known to have heart disease  Lung disease

## 2020-11-23 NOTE — ASSESSMENT & PLAN NOTE
Please see under HPI  Most recent Hb about 10     Passes black stool for several years   Not known to have stomach ulcers     He has a history of Bienvenido en y surgery   -  He reports having black stool ( Not in [place] )   He is not  known to have peptic ulcer although he is at risk , given the Bienvenido en Y     Suggested follow up for possible upper GI bleeding    Reports passing black stool for several years   I wonder, if there is an element of Upper GI bleeding in his anemia ( Does not seem acute )     Most recent Hb > 10

## 2020-11-23 NOTE — DISCHARGE INSTRUCTIONS
Your surgery has been scheduled for:__________________________________________    You should report to:  ____[facility], located on the [place] side of the first floor of the           [facility] ([phone])  ____[facility], located on the [facility] side of the            Second floor of the [facility] ([phone])  ____[facility] ([facility]) [facility]ated [facility] [address] [facility] [address]  Please Note   - Tell your doctor if you take Aspirin, products containing Aspirin, herbal medications  or blood thinners, such as Coumadin, Ticlid, or Plavix.  (Consult your provider regarding holding or stopping before surgery).  - Arrange for someone to drive you home following surgery.  You will not be allowed to leave the surgical facility alone or drive yourself home following sedation and anesthesia.  Before Surgery  - Stop taking all herbal medications 14days prior to surgery  - No Motrin/Advil (Ibuprofen) 7 days before surgery  - No Aleve (Naproxen) 7 days before surgery  - Stop Taking Aspirin, products containing Aspirin _____days before surgery  - Stop taking blood thinners_______days before surgery  - No Goody's/BC  Powder 7 days before surgery  - Refrain from drinking alcoholic beverages for 24hours before and after surgery  - Stop or limit smoking _________days before surgery  - You may take Tylenol for pain  Night before Surgery   Stop ALL solid food, gum, candy (including vitamins) 8 hours before arrival time.  (Please note: If your surgeon gives you different eating and drinking instructions, please follow surgeon's directions.)   Stop all CLOUDY liquids: coffee with creamer, formula, tube feeds, cloudy juices, non-human milk and breast milk with additives, 6 hours prior to arrival time.   Stop plain breast milk 4 hours prior to arrival time.   The patient should be ENCOURAGED to drink carbohydrate-rich clear liquids (sports  drinks, clear juices) until 2 hours prior to arrival time.   CLEAR liquids include only water, black coffee NO creamer, clear oral rehydration drinks, clear sports drinks or clear fruit juices (no orange juice, no pulpy juices, no apple cider). Advise patients if they can read newsprint through the liquid, it qualifies as clear liquid.    IF IN DOUBT, drink water instead.   - Take a shower or bath (shower is recommended).  Bathe with Hibiclens soap or an antibacterial soap from the neck down.  If not supplied by your surgeon, hibiclens soap will need to be purchased over the counter in pharmacy.  Rinse soap off thoroughly.  - Shampoo your hair with your regular shampoo  The Day of Surgery  · NOTHING TO  DRINK 2 hours before arrival time. If you are told to take medication on the morning of surgery, it may be taken with a sip of water.   - Take another bath or shower with hibiclens or any antibacterial soap, to reduce the chance of infection.  - Take heart and blood pressure medications with a small sip of water, as advised by the perioperative team.  - Do not take fluid pills  - You may brush your teeth and rinse your mouth, but do not swallow any additional water.   - Do not apply perfumes, powder, body lotions or deodorant on the day of surgery.  - Nail polish should be removed.  - Do not wear makeup or moisturizer  - Wear comfortable clothes, such as a button front shirt and loose fitting pants.  - Leave all jewelry, including body piercings, and valuables at home.    - Bring any devices you will neeed after surgery such as crutches or canes.  - If you have sleep apnea, please bring your CPAP machine  In the event that your physical condition changes including the onset of a cold or respiratory illness, or if you have to delay or cancel your surgery, please notify your surgeon.  Anesthesia: General Anesthesia     You are watched continuously during your procedure by your anesthesia provider.     Youre due to  have surgery. During surgery, youll be given medicine called anesthesia or anesthetic. This will keep you comfortable and pain-free. Your anesthesia provider will use general anesthesia.  What is general anesthesia?  General anesthesia puts you into a state like deep sleep. It goes into the bloodstream (IV anesthetics), into the lungs (gas anesthetics), or both. You feel nothing during the procedure. You will not remember it. During the procedure, the anesthesia provider monitors you continuously. He or she checks your heart rate and rhythm, blood pressure, breathing, and blood oxygen.  · IV anesthetics. IV anesthetics are given through an IV line in your arm. Theyre often given first. This is so you are asleep before a gas anesthetic is started. Some kinds of IV anesthetics relieve pain. Others relax you. Your doctor will decide which kind is best in your case.  · Gas anesthetics. Gas anesthetics are breathed into the lungs. They are often used to keep you asleep. They can be given through a facemask or a tube placed in your larynx or trachea (breathing tube).  ? If you have a facemask, your anesthesia provider will most likely place it over your nose and mouth while youre still awake. Youll breathe oxygen through the mask as your IV anesthetic is started. Gas anesthetic may be added through the mask.  ? If you have a tube in the larynx or trachea, it will be inserted into your throat after youre asleep.  Anesthesia tools and medicines  You will likely have:  · IV anesthetics. These are put into an IV line into your bloodstream.  · Gas anesthetics. You breathe these anesthetics into your lungs, where they pass into your bloodstream.  · Pulse oximeter. This is a small clip that is attached to the end of your finger. This measures your blood oxygen level.  · Electrocardiography leads (electrodes). These are small sticky pads that are placed on your chest. They record your heart rate and rhythm.  · Blood pressure  cuff. This reads your blood pressure.  Risks and possible complications  General anesthesia has some risks. These include:  · Breathing problems  · Nausea and vomiting  · Sore throat or hoarseness (usually temporary)  · Allergic reaction to the anesthetic  · Irregular heartbeat (rare)  · Cardiac arrest (rare)   Anesthesia safety  · Follow all instructions you are given for how long not to eat or drink before your procedure.  · Be sure your doctor knows what medicines and drugs you take. This includes over-the-counter medicines, herbs, supplements, alcohol or other drugs. You will be asked when those were last taken.  · Have an adult family member or friend drive you home after the procedure.  · For the first 24 hours after your surgery:  ? Do not drive or use heavy equipment.  ? Do not make important decisions or sign legal documents. If important decisions or signing legal documents is necessary during the first 24 hours after surgery, have a trusted family member or spouse act on your behalf.  ? Avoid alcohol.  ? Have a responsible adult stay with you. He or she can watch for problems and help keep you safe.  Date Last Reviewed: 12/1/2016 © 2000-2017 [employer]. [address] [place] [address]. All rights reserved. This information is not intended as a substitute for professional medical care. Always follow your healthcare professional's instructions

## 2020-11-23 NOTE — ASSESSMENT & PLAN NOTE
Creatinine from 11/4/2020 -  1.6   Under Nephrology care   As per wife , can proceed with surgery     Low volume from reduced oral intake , Reduced tolerance to PEG feeds could be contributing    Not on Chronic NSASID    Contributors - Low BP     Takes Florinef to help reatinb food   Not known to have adrenal , pituitary problems     Stages of CKD discussed  Deleterious effects NSAID's , Beneficial effects of Hydration discussed   Tylenol as needed for pain     I  suggest monitoring renal function, in put and out put status nicole-operatively. I  suggest avoiding nephrotoxic medication including NSAIDs, COX2 inhibitors, intravenous contrast agent,avoiding hypotension to prevent further renal impairment.     Had IV contrast today   Suggested to keep him hydrated

## 2020-11-23 NOTE — ASSESSMENT & PLAN NOTE
Both parents had heart disease  No personal history of heart disease or suggestions of symptomatic Heart disease with fairly good functional capacity until the spine injury in 2014

## 2020-11-23 NOTE — ASSESSMENT & PLAN NOTE
As per wife , had an arterial injury that was attributed to scar tissue from gastric bypass    Required 8 units of transfusion

## 2020-11-23 NOTE — ASSESSMENT & PLAN NOTE
Since had the Cervical spine injury in 2014   Had hard ware placement in 2014 for the injury  Had hardware removal about 6 months later which did not help     Has problem drinking water- can drink small amounts    On Soft food     On soft consistency diet and on thin liquids    Has choking   Had aspiration about 2016   Had to be resuscitated  Aspirated again a few months ago       Dysphagia- I suggest providing soft diet or other wise directed , in view of the preexisting dysphagia as medication used in the perioperative period can possibly increase the dysphagia. I suggest aspiration precautions     The likely cause of his dysphagia is C spine related       '

## 2020-11-27 ENCOUNTER — PATIENT MESSAGE (OUTPATIENT)
Dept: SURGERY | Facility: HOSPITAL | Age: 60
End: 2020-11-27

## 2020-12-01 ENCOUNTER — PATIENT MESSAGE (OUTPATIENT)
Dept: SURGERY | Facility: CLINIC | Age: 60
End: 2020-12-01

## 2020-12-01 ENCOUNTER — TELEPHONE (OUTPATIENT)
Dept: SURGERY | Facility: CLINIC | Age: 60
End: 2020-12-01

## 2020-12-01 ENCOUNTER — PATIENT MESSAGE (OUTPATIENT)
Dept: SURGERY | Facility: HOSPITAL | Age: 60
End: 2020-12-01

## 2020-12-01 NOTE — TELEPHONE ENCOUNTER
"Spoke with Vianca, informed her that we have not received the paperwork we needed despite faxing it twice once on 11/12 and 11/27. She gave me a "one time fax number" of [phone] to send it to.   "

## 2020-12-01 NOTE — TELEPHONE ENCOUNTER
Spoke with patient's wife, states that she asked Dr Andrade' office for colonoscopy and pathology reports but all they sent were last office notes. Patient's wife states that she would rather wait until after the holidays to proceed with surgery at this point. She plans on calling and if she needs to drive to [facility] to get records she will. OR called and notified to put case on hold, will message Dr Garcia to let her know.

## 2020-12-01 NOTE — TELEPHONE ENCOUNTER
""All staff are busy at this time and cannot come to the phone"-Left message that we still have not received records and stressed urgency of request, left my personal phone [phone] for return call.   "

## 2020-12-01 NOTE — TELEPHONE ENCOUNTER
Spoke with patient's wife. I let her know that we have still not received records from Dr Andrade at [facility] despite multiple faxes and phone calls. Requested to have patient call and request colored imaging and reports sent to him via email so that we can get a color copy of his imaging and pathology.

## 2020-12-02 ENCOUNTER — DOCUMENTATION ONLY (OUTPATIENT)
Dept: SURGERY | Facility: CLINIC | Age: 60
End: 2020-12-02

## 2020-12-02 NOTE — PROGRESS NOTES
Outside record review, [facility]:    Colonoscopy (2/18/2020)  12mm polyp in mid-ascending colon, semi-sessile, awkward position.  Injected with methylene blue, piecemeal and incomplete EMR performed.  The colonoscope was exchanged in order to get a better position.  At that time hot biopsy forceps were also used to remove residual tissue, and polypectomy bed was noted to be very fibrotic.    Pathology (2/18/2020)  Ascending colon polyp, piece-meal removal--> tubular adenoma (2.4 x 1.1cm)            Negin Garcia

## 2020-12-03 ENCOUNTER — ANESTHESIA (OUTPATIENT)
Dept: SURGERY | Facility: HOSPITAL | Age: 60
DRG: 331 | End: 2020-12-03
Payer: MEDICARE

## 2020-12-04 ENCOUNTER — TELEPHONE (OUTPATIENT)
Dept: SURGERY | Facility: CLINIC | Age: 60
End: 2020-12-04

## 2020-12-04 ENCOUNTER — PATIENT MESSAGE (OUTPATIENT)
Dept: SURGERY | Facility: HOSPITAL | Age: 60
End: 2020-12-04

## 2020-12-08 ENCOUNTER — PATIENT MESSAGE (OUTPATIENT)
Dept: SURGERY | Facility: HOSPITAL | Age: 60
End: 2020-12-08

## 2020-12-08 ENCOUNTER — TELEPHONE (OUTPATIENT)
Dept: SURGERY | Facility: CLINIC | Age: 60
End: 2020-12-08

## 2020-12-14 RX ORDER — AMLODIPINE BESYLATE 5 MG/1
5 TABLET ORAL DAILY PRN
COMMUNITY

## 2020-12-21 ENCOUNTER — PATIENT MESSAGE (OUTPATIENT)
Dept: INTERNAL MEDICINE | Facility: CLINIC | Age: 60
End: 2020-12-21

## 2020-12-28 ENCOUNTER — TELEPHONE (OUTPATIENT)
Dept: SURGERY | Facility: CLINIC | Age: 60
End: 2020-12-28

## 2020-12-28 ENCOUNTER — PATIENT MESSAGE (OUTPATIENT)
Dept: SURGERY | Facility: HOSPITAL | Age: 60
End: 2020-12-28

## 2020-12-28 RX ORDER — MUPIROCIN 20 MG/G
OINTMENT TOPICAL 2 TIMES DAILY
Status: ON HOLD | COMMUNITY
Start: 2020-12-27 | End: 2021-01-05 | Stop reason: HOSPADM

## 2020-12-29 ENCOUNTER — HOSPITAL ENCOUNTER (INPATIENT)
Facility: HOSPITAL | Age: 60
LOS: 7 days | Discharge: HOME | DRG: 331 | End: 2021-01-05
Attending: COLON & RECTAL SURGERY | Admitting: COLON & RECTAL SURGERY
Payer: MEDICARE

## 2020-12-29 DIAGNOSIS — K63.5 COLON POLYP: ICD-10-CM

## 2020-12-29 DIAGNOSIS — G89.29 OTHER CHRONIC PAIN: ICD-10-CM

## 2020-12-29 DIAGNOSIS — K63.5 POLYP OF ASCENDING COLON, UNSPECIFIED TYPE: Primary | ICD-10-CM

## 2020-12-29 DIAGNOSIS — R13.10 DYSPHAGIA, UNSPECIFIED TYPE: ICD-10-CM

## 2020-12-29 DIAGNOSIS — G40.909 SEIZURE DISORDER: ICD-10-CM

## 2020-12-29 DIAGNOSIS — I10 ESSENTIAL HYPERTENSION: ICD-10-CM

## 2020-12-29 DIAGNOSIS — N18.31 STAGE 3A CHRONIC KIDNEY DISEASE: ICD-10-CM

## 2020-12-29 LAB
ABO + RH BLD: NORMAL
ANION GAP SERPL CALC-SCNC: 10 MMOL/L (ref 8–16)
BASOPHILS # BLD AUTO: 0.01 K/UL (ref 0–0.2)
BASOPHILS NFR BLD: 0.2 % (ref 0–1.9)
BLD GP AB SCN CELLS X3 SERPL QL: NORMAL
BUN SERPL-MCNC: 17 MG/DL (ref 8–23)
CALCIUM SERPL-MCNC: 7.2 MG/DL (ref 8.7–10.5)
CHLORIDE SERPL-SCNC: 101 MMOL/L (ref 95–110)
CO2 SERPL-SCNC: 25 MMOL/L (ref 23–29)
CREAT SERPL-MCNC: 1.3 MG/DL (ref 0.5–1.4)
DIFFERENTIAL METHOD: ABNORMAL
EOSINOPHIL # BLD AUTO: 0 K/UL (ref 0–0.5)
EOSINOPHIL NFR BLD: 0.2 % (ref 0–8)
ERYTHROCYTE [DISTWIDTH] IN BLOOD BY AUTOMATED COUNT: 12.2 % (ref 11.5–14.5)
EST. GFR  (AFRICAN AMERICAN): >60 ML/MIN/1.73 M^2
EST. GFR  (NON AFRICAN AMERICAN): 58.5 ML/MIN/1.73 M^2
GLUCOSE SERPL-MCNC: 147 MG/DL (ref 70–110)
GLUCOSE SERPL-MCNC: 151 MG/DL (ref 70–110)
HCO3 UR-SCNC: 29.9 MMOL/L (ref 24–28)
HCT VFR BLD AUTO: 26.7 % (ref 40–54)
HCT VFR BLD CALC: 27 %PCV (ref 36–54)
HGB BLD-MCNC: 8.6 G/DL (ref 14–18)
IMM GRANULOCYTES # BLD AUTO: 0.02 K/UL (ref 0–0.04)
IMM GRANULOCYTES NFR BLD AUTO: 0.3 % (ref 0–0.5)
LYMPHOCYTES # BLD AUTO: 0.2 K/UL (ref 1–4.8)
LYMPHOCYTES NFR BLD: 3.5 % (ref 18–48)
MCH RBC QN AUTO: 29.1 PG (ref 27–31)
MCHC RBC AUTO-ENTMCNC: 32.2 G/DL (ref 32–36)
MCV RBC AUTO: 90 FL (ref 82–98)
MONOCYTES # BLD AUTO: 0 K/UL (ref 0.3–1)
MONOCYTES NFR BLD: 0.6 % (ref 4–15)
NEUTROPHILS # BLD AUTO: 6.1 K/UL (ref 1.8–7.7)
NEUTROPHILS NFR BLD: 95.2 % (ref 38–73)
NRBC BLD-RTO: 0 /100 WBC
PCO2 BLDA: 47.2 MMHG (ref 35–45)
PH SMN: 7.41 [PH] (ref 7.35–7.45)
PLATELET # BLD AUTO: 142 K/UL (ref 150–350)
PMV BLD AUTO: 10.6 FL (ref 9.2–12.9)
PO2 BLDA: 502 MMHG (ref 80–100)
POC BE: 5 MMOL/L
POC IONIZED CALCIUM: 1.08 MMOL/L (ref 1.06–1.42)
POC SATURATED O2: 100 % (ref 95–100)
POC TCO2: 31 MMOL/L (ref 23–27)
POTASSIUM BLD-SCNC: 3.1 MMOL/L (ref 3.5–5.1)
POTASSIUM SERPL-SCNC: 3.1 MMOL/L (ref 3.5–5.1)
RBC # BLD AUTO: 2.96 M/UL (ref 4.6–6.2)
SAMPLE: ABNORMAL
SARS-COV-2 RDRP RESP QL NAA+PROBE: NEGATIVE
SODIUM BLD-SCNC: 138 MMOL/L (ref 136–145)
SODIUM SERPL-SCNC: 136 MMOL/L (ref 136–145)
WBC # BLD AUTO: 6.37 K/UL (ref 3.9–12.7)

## 2020-12-29 PROCEDURE — 25000003 PHARM REV CODE 250: Performed by: NURSE ANESTHETIST, CERTIFIED REGISTERED

## 2020-12-29 PROCEDURE — 88307 TISSUE EXAM BY PATHOLOGIST: CPT | Performed by: PATHOLOGY

## 2020-12-29 PROCEDURE — D9220A PRA ANESTHESIA: ICD-10-PCS | Mod: ANES,,, | Performed by: SURGERY

## 2020-12-29 PROCEDURE — S0030 INJECTION, METRONIDAZOLE: HCPCS | Performed by: NURSE PRACTITIONER

## 2020-12-29 PROCEDURE — U0002 COVID-19 LAB TEST NON-CDC: HCPCS

## 2020-12-29 PROCEDURE — 63600175 PHARM REV CODE 636 W HCPCS: Performed by: NURSE PRACTITIONER

## 2020-12-29 PROCEDURE — 44205 LAP COLECTOMY PART W/ILEUM: CPT | Mod: 22,,, | Performed by: COLON & RECTAL SURGERY

## 2020-12-29 PROCEDURE — 36415 COLL VENOUS BLD VENIPUNCTURE: CPT

## 2020-12-29 PROCEDURE — 25000003 PHARM REV CODE 250: Performed by: SURGERY

## 2020-12-29 PROCEDURE — 25000003 PHARM REV CODE 250: Performed by: NURSE PRACTITIONER

## 2020-12-29 PROCEDURE — P9045 ALBUMIN (HUMAN), 5%, 250 ML: HCPCS | Mod: JG | Performed by: NURSE ANESTHETIST, CERTIFIED REGISTERED

## 2020-12-29 PROCEDURE — 94002 VENT MGMT INPAT INIT DAY: CPT

## 2020-12-29 PROCEDURE — 37000009 HC ANESTHESIA EA ADD 15 MINS: Performed by: COLON & RECTAL SURGERY

## 2020-12-29 PROCEDURE — 36620 PR INSERT CATH,ART,PERCUT,SHORTTERM: ICD-10-PCS | Mod: 59,,, | Performed by: SURGERY

## 2020-12-29 PROCEDURE — 71000033 HC RECOVERY, INTIAL HOUR: Performed by: COLON & RECTAL SURGERY

## 2020-12-29 PROCEDURE — 36000710: Performed by: COLON & RECTAL SURGERY

## 2020-12-29 PROCEDURE — 27201037 HC PRESSURE MONITORING SET UP

## 2020-12-29 PROCEDURE — 36000711: Performed by: COLON & RECTAL SURGERY

## 2020-12-29 PROCEDURE — 88307 PR  SURG PATH,LEVEL V: ICD-10-PCS | Mod: 26,,, | Performed by: PATHOLOGY

## 2020-12-29 PROCEDURE — 63600175 PHARM REV CODE 636 W HCPCS: Performed by: NURSE ANESTHETIST, CERTIFIED REGISTERED

## 2020-12-29 PROCEDURE — 37000008 HC ANESTHESIA 1ST 15 MINUTES: Performed by: COLON & RECTAL SURGERY

## 2020-12-29 PROCEDURE — 85025 COMPLETE CBC W/AUTO DIFF WBC: CPT

## 2020-12-29 PROCEDURE — 36620 INSERTION CATHETER ARTERY: CPT | Mod: 59,,, | Performed by: SURGERY

## 2020-12-29 PROCEDURE — 63600175 PHARM REV CODE 636 W HCPCS: Performed by: SURGERY

## 2020-12-29 PROCEDURE — 86900 BLOOD TYPING SEROLOGIC ABO: CPT

## 2020-12-29 PROCEDURE — 27000221 HC OXYGEN, UP TO 24 HOURS

## 2020-12-29 PROCEDURE — 88307 TISSUE EXAM BY PATHOLOGIST: CPT | Mod: 26,,, | Performed by: PATHOLOGY

## 2020-12-29 PROCEDURE — 80048 BASIC METABOLIC PNL TOTAL CA: CPT

## 2020-12-29 PROCEDURE — 25000003 PHARM REV CODE 250: Performed by: COLON & RECTAL SURGERY

## 2020-12-29 PROCEDURE — 94761 N-INVAS EAR/PLS OXIMETRY MLT: CPT

## 2020-12-29 PROCEDURE — 44205 PR LAP,SURG,COLECTOMY,W/REMVL TERM ILEUM: ICD-10-PCS | Mod: 22,,, | Performed by: COLON & RECTAL SURGERY

## 2020-12-29 PROCEDURE — 71000039 HC RECOVERY, EACH ADD'L HOUR: Performed by: COLON & RECTAL SURGERY

## 2020-12-29 PROCEDURE — 71000015 HC POSTOP RECOV 1ST HR: Performed by: COLON & RECTAL SURGERY

## 2020-12-29 PROCEDURE — D9220A PRA ANESTHESIA: Mod: CRNA,,, | Performed by: NURSE ANESTHETIST, CERTIFIED REGISTERED

## 2020-12-29 PROCEDURE — D9220A PRA ANESTHESIA: Mod: ANES,,, | Performed by: SURGERY

## 2020-12-29 PROCEDURE — 20600001 HC STEP DOWN PRIVATE ROOM

## 2020-12-29 PROCEDURE — 25000242 PHARM REV CODE 250 ALT 637 W/ HCPCS: Performed by: NURSE ANESTHETIST, CERTIFIED REGISTERED

## 2020-12-29 PROCEDURE — D9220A PRA ANESTHESIA: ICD-10-PCS | Mod: CRNA,,, | Performed by: NURSE ANESTHETIST, CERTIFIED REGISTERED

## 2020-12-29 PROCEDURE — 27201423 OPTIME MED/SURG SUP & DEVICES STERILE SUPPLY: Performed by: COLON & RECTAL SURGERY

## 2020-12-29 RX ORDER — OXYCODONE HYDROCHLORIDE 5 MG/1
5 TABLET ORAL EVERY 4 HOURS PRN
Status: DISCONTINUED | OUTPATIENT
Start: 2020-12-29 | End: 2020-12-31

## 2020-12-29 RX ORDER — FLUDROCORTISONE ACETATE 0.1 MG/1
100 TABLET ORAL DAILY
Status: DISCONTINUED | OUTPATIENT
Start: 2020-12-29 | End: 2021-01-05 | Stop reason: HOSPADM

## 2020-12-29 RX ORDER — ONDANSETRON 2 MG/ML
INJECTION INTRAMUSCULAR; INTRAVENOUS
Status: DISCONTINUED | OUTPATIENT
Start: 2020-12-29 | End: 2020-12-29

## 2020-12-29 RX ORDER — NALOXONE HCL 0.4 MG/ML
0.02 VIAL (ML) INJECTION
Status: DISCONTINUED | OUTPATIENT
Start: 2020-12-29 | End: 2020-12-31

## 2020-12-29 RX ORDER — MIDAZOLAM HYDROCHLORIDE 1 MG/ML
INJECTION, SOLUTION INTRAMUSCULAR; INTRAVENOUS
Status: DISCONTINUED | OUTPATIENT
Start: 2020-12-29 | End: 2020-12-29

## 2020-12-29 RX ORDER — BACLOFEN 10 MG/1
30 TABLET ORAL NIGHTLY
Status: DISCONTINUED | OUTPATIENT
Start: 2020-12-29 | End: 2021-01-05 | Stop reason: HOSPADM

## 2020-12-29 RX ORDER — PROPOFOL 10 MG/ML
VIAL (ML) INTRAVENOUS
Status: DISCONTINUED | OUTPATIENT
Start: 2020-12-29 | End: 2020-12-29

## 2020-12-29 RX ORDER — FAMOTIDINE 10 MG/ML
INJECTION INTRAVENOUS
Status: DISCONTINUED | OUTPATIENT
Start: 2020-12-29 | End: 2020-12-29

## 2020-12-29 RX ORDER — SODIUM CHLORIDE 0.9 % (FLUSH) 0.9 %
10 SYRINGE (ML) INJECTION
Status: DISCONTINUED | OUTPATIENT
Start: 2020-12-29 | End: 2020-12-29

## 2020-12-29 RX ORDER — HEPARIN SODIUM 5000 [USP'U]/ML
5000 INJECTION, SOLUTION INTRAVENOUS; SUBCUTANEOUS EVERY 8 HOURS
Status: DISCONTINUED | OUTPATIENT
Start: 2020-12-30 | End: 2021-01-04

## 2020-12-29 RX ORDER — ALBUMIN HUMAN 50 G/1000ML
SOLUTION INTRAVENOUS CONTINUOUS PRN
Status: DISCONTINUED | OUTPATIENT
Start: 2020-12-29 | End: 2020-12-29

## 2020-12-29 RX ORDER — SODIUM CHLORIDE, SODIUM LACTATE, POTASSIUM CHLORIDE, CALCIUM CHLORIDE 600; 310; 30; 20 MG/100ML; MG/100ML; MG/100ML; MG/100ML
INJECTION, SOLUTION INTRAVENOUS CONTINUOUS
Status: DISCONTINUED | OUTPATIENT
Start: 2020-12-29 | End: 2020-12-31

## 2020-12-29 RX ORDER — AMITRIPTYLINE HYDROCHLORIDE 150 MG/1
150 TABLET ORAL NIGHTLY
Status: DISCONTINUED | OUTPATIENT
Start: 2020-12-29 | End: 2021-01-05 | Stop reason: HOSPADM

## 2020-12-29 RX ORDER — BUPIVACAINE HYDROCHLORIDE AND EPINEPHRINE 2.5; 5 MG/ML; UG/ML
INJECTION, SOLUTION EPIDURAL; INFILTRATION; INTRACAUDAL; PERINEURAL
Status: DISCONTINUED | OUTPATIENT
Start: 2020-12-29 | End: 2020-12-29 | Stop reason: HOSPADM

## 2020-12-29 RX ORDER — ACETAMINOPHEN 500 MG
1000 TABLET ORAL EVERY 8 HOURS
Status: DISCONTINUED | OUTPATIENT
Start: 2020-12-30 | End: 2020-12-30

## 2020-12-29 RX ORDER — FENTANYL CITRATE 50 UG/ML
INJECTION, SOLUTION INTRAMUSCULAR; INTRAVENOUS
Status: DISCONTINUED | OUTPATIENT
Start: 2020-12-29 | End: 2020-12-29

## 2020-12-29 RX ORDER — HEPARIN SODIUM 5000 [USP'U]/ML
5000 INJECTION, SOLUTION INTRAVENOUS; SUBCUTANEOUS EVERY 8 HOURS
Status: COMPLETED | OUTPATIENT
Start: 2020-12-29 | End: 2020-12-29

## 2020-12-29 RX ORDER — METRONIDAZOLE 500 MG/100ML
500 INJECTION, SOLUTION INTRAVENOUS
Status: COMPLETED | OUTPATIENT
Start: 2020-12-29 | End: 2020-12-29

## 2020-12-29 RX ORDER — GABAPENTIN 300 MG/1
300 CAPSULE ORAL
Status: DISCONTINUED | OUTPATIENT
Start: 2020-12-29 | End: 2020-12-29

## 2020-12-29 RX ORDER — ALVIMOPAN 12 MG/1
12 CAPSULE ORAL ONCE
Status: DISCONTINUED | OUTPATIENT
Start: 2020-12-29 | End: 2020-12-29

## 2020-12-29 RX ORDER — ONDANSETRON 2 MG/ML
4 INJECTION INTRAMUSCULAR; INTRAVENOUS EVERY 8 HOURS PRN
Status: DISCONTINUED | OUTPATIENT
Start: 2020-12-29 | End: 2021-01-05 | Stop reason: HOSPADM

## 2020-12-29 RX ORDER — GABAPENTIN 250 MG/5ML
600 SOLUTION ORAL EVERY 8 HOURS
Status: DISCONTINUED | OUTPATIENT
Start: 2020-12-29 | End: 2021-01-05 | Stop reason: HOSPADM

## 2020-12-29 RX ORDER — LEVETIRACETAM 100 MG/ML
1000 SOLUTION ORAL 2 TIMES DAILY
Status: DISCONTINUED | OUTPATIENT
Start: 2020-12-29 | End: 2021-01-05 | Stop reason: HOSPADM

## 2020-12-29 RX ORDER — BACLOFEN 10 MG/1
30 TABLET ORAL NIGHTLY
Status: DISCONTINUED | OUTPATIENT
Start: 2020-12-29 | End: 2020-12-29

## 2020-12-29 RX ORDER — HYDROMORPHONE HYDROCHLORIDE 1 MG/ML
0.2 INJECTION, SOLUTION INTRAMUSCULAR; INTRAVENOUS; SUBCUTANEOUS EVERY 5 MIN PRN
Status: DISCONTINUED | OUTPATIENT
Start: 2020-12-29 | End: 2020-12-29

## 2020-12-29 RX ORDER — TRIPROLIDINE/PSEUDOEPHEDRINE 2.5MG-60MG
600 TABLET ORAL
Status: COMPLETED | OUTPATIENT
Start: 2020-12-29 | End: 2020-12-29

## 2020-12-29 RX ORDER — ACETAMINOPHEN 650 MG/20.3ML
975 LIQUID ORAL
Status: COMPLETED | OUTPATIENT
Start: 2020-12-29 | End: 2020-12-29

## 2020-12-29 RX ORDER — ACETAMINOPHEN 10 MG/ML
1000 INJECTION, SOLUTION INTRAVENOUS EVERY 8 HOURS
Status: DISCONTINUED | OUTPATIENT
Start: 2020-12-29 | End: 2020-12-30

## 2020-12-29 RX ORDER — NEOSTIGMINE METHYLSULFATE 0.5 MG/ML
INJECTION, SOLUTION INTRAVENOUS
Status: DISCONTINUED | OUTPATIENT
Start: 2020-12-29 | End: 2020-12-29

## 2020-12-29 RX ORDER — POTASSIUM CHLORIDE 7.45 MG/ML
10 INJECTION INTRAVENOUS
Status: COMPLETED | OUTPATIENT
Start: 2020-12-29 | End: 2020-12-29

## 2020-12-29 RX ORDER — EPHEDRINE SULFATE 50 MG/ML
INJECTION, SOLUTION INTRAVENOUS
Status: DISCONTINUED | OUTPATIENT
Start: 2020-12-29 | End: 2020-12-29

## 2020-12-29 RX ORDER — MUPIROCIN 20 MG/G
OINTMENT TOPICAL 2 TIMES DAILY
Status: COMPLETED | OUTPATIENT
Start: 2020-12-29 | End: 2021-01-03

## 2020-12-29 RX ORDER — HYDROMORPHONE HCL IN 0.9% NACL 6 MG/30 ML
PATIENT CONTROLLED ANALGESIA SYRINGE INTRAVENOUS CONTINUOUS
Status: DISCONTINUED | OUTPATIENT
Start: 2020-12-29 | End: 2020-12-31

## 2020-12-29 RX ORDER — DEXAMETHASONE SODIUM PHOSPHATE 4 MG/ML
INJECTION, SOLUTION INTRA-ARTICULAR; INTRALESIONAL; INTRAMUSCULAR; INTRAVENOUS; SOFT TISSUE
Status: DISCONTINUED | OUTPATIENT
Start: 2020-12-29 | End: 2020-12-29

## 2020-12-29 RX ORDER — PHENYLEPHRINE HYDROCHLORIDE 10 MG/ML
INJECTION INTRAVENOUS
Status: DISCONTINUED | OUTPATIENT
Start: 2020-12-29 | End: 2020-12-29

## 2020-12-29 RX ORDER — DIPHENHYDRAMINE HYDROCHLORIDE 50 MG/ML
12.5 INJECTION INTRAMUSCULAR; INTRAVENOUS EVERY 6 HOURS PRN
Status: DISCONTINUED | OUTPATIENT
Start: 2020-12-29 | End: 2021-01-05 | Stop reason: HOSPADM

## 2020-12-29 RX ORDER — GABAPENTIN 300 MG/1
300 CAPSULE ORAL 3 TIMES DAILY
Status: DISCONTINUED | OUTPATIENT
Start: 2020-12-29 | End: 2020-12-29

## 2020-12-29 RX ORDER — SODIUM CHLORIDE 9 MG/ML
INJECTION, SOLUTION INTRAVENOUS CONTINUOUS
Status: DISCONTINUED | OUTPATIENT
Start: 2020-12-29 | End: 2020-12-29

## 2020-12-29 RX ORDER — ONDANSETRON 2 MG/ML
4 INJECTION INTRAMUSCULAR; INTRAVENOUS DAILY PRN
Status: DISCONTINUED | OUTPATIENT
Start: 2020-12-29 | End: 2020-12-29

## 2020-12-29 RX ORDER — AMLODIPINE BESYLATE 5 MG/1
5 TABLET ORAL DAILY PRN
Status: DISCONTINUED | OUTPATIENT
Start: 2020-12-29 | End: 2021-01-05 | Stop reason: HOSPADM

## 2020-12-29 RX ORDER — MUPIROCIN 20 MG/G
1 OINTMENT TOPICAL
Status: DISCONTINUED | OUTPATIENT
Start: 2020-12-29 | End: 2020-12-29

## 2020-12-29 RX ORDER — MIDODRINE HYDROCHLORIDE 5 MG/1
10 TABLET ORAL DAILY PRN
Status: DISCONTINUED | OUTPATIENT
Start: 2020-12-29 | End: 2021-01-05 | Stop reason: HOSPADM

## 2020-12-29 RX ORDER — FENTANYL CITRATE 50 UG/ML
25 INJECTION, SOLUTION INTRAMUSCULAR; INTRAVENOUS EVERY 5 MIN PRN
Status: DISCONTINUED | OUTPATIENT
Start: 2020-12-29 | End: 2020-12-29

## 2020-12-29 RX ORDER — VASOPRESSIN 20 [USP'U]/ML
INJECTION, SOLUTION INTRAMUSCULAR; SUBCUTANEOUS
Status: DISCONTINUED | OUTPATIENT
Start: 2020-12-29 | End: 2020-12-29

## 2020-12-29 RX ORDER — LIDOCAINE HYDROCHLORIDE 10 MG/ML
INJECTION INFILTRATION; PERINEURAL
Status: DISCONTINUED | OUTPATIENT
Start: 2020-12-29 | End: 2020-12-29 | Stop reason: HOSPADM

## 2020-12-29 RX ORDER — SODIUM CHLORIDE 9 MG/ML
INJECTION, SOLUTION INTRAVENOUS
Status: COMPLETED | OUTPATIENT
Start: 2020-12-29 | End: 2020-12-29

## 2020-12-29 RX ORDER — OXYCODONE HYDROCHLORIDE 10 MG/1
10 TABLET ORAL EVERY 4 HOURS PRN
Status: DISCONTINUED | OUTPATIENT
Start: 2020-12-29 | End: 2020-12-31

## 2020-12-29 RX ORDER — NICARDIPINE HYDROCHLORIDE 2.5 MG/ML
INJECTION INTRAVENOUS
Status: DISCONTINUED | OUTPATIENT
Start: 2020-12-29 | End: 2020-12-29

## 2020-12-29 RX ORDER — LIDOCAINE HYDROCHLORIDE 10 MG/ML
1 INJECTION, SOLUTION EPIDURAL; INFILTRATION; INTRACAUDAL; PERINEURAL
Status: DISCONTINUED | OUTPATIENT
Start: 2020-12-29 | End: 2020-12-29

## 2020-12-29 RX ORDER — ROCURONIUM BROMIDE 10 MG/ML
INJECTION, SOLUTION INTRAVENOUS
Status: DISCONTINUED | OUTPATIENT
Start: 2020-12-29 | End: 2020-12-29

## 2020-12-29 RX ORDER — SUCCINYLCHOLINE CHLORIDE 20 MG/ML
INJECTION INTRAMUSCULAR; INTRAVENOUS
Status: DISCONTINUED | OUTPATIENT
Start: 2020-12-29 | End: 2020-12-29

## 2020-12-29 RX ORDER — KETAMINE HCL IN 0.9 % NACL 50 MG/5 ML
SYRINGE (ML) INTRAVENOUS
Status: DISCONTINUED | OUTPATIENT
Start: 2020-12-29 | End: 2020-12-29

## 2020-12-29 RX ORDER — SODIUM CHLORIDE 0.9 % (FLUSH) 0.9 %
10 SYRINGE (ML) INJECTION
Status: DISCONTINUED | OUTPATIENT
Start: 2020-12-29 | End: 2021-01-05 | Stop reason: HOSPADM

## 2020-12-29 RX ORDER — ALBUTEROL SULFATE 90 UG/1
AEROSOL, METERED RESPIRATORY (INHALATION)
Status: DISCONTINUED | OUTPATIENT
Start: 2020-12-29 | End: 2020-12-29

## 2020-12-29 RX ORDER — TRAMADOL HYDROCHLORIDE 50 MG/1
50 TABLET ORAL EVERY 6 HOURS PRN
Status: DISCONTINUED | OUTPATIENT
Start: 2020-12-29 | End: 2021-01-05 | Stop reason: HOSPADM

## 2020-12-29 RX ORDER — FLUOXETINE 20 MG/5ML
60 SOLUTION ORAL DAILY
Status: DISCONTINUED | OUTPATIENT
Start: 2020-12-29 | End: 2021-01-05 | Stop reason: HOSPADM

## 2020-12-29 RX ORDER — BACLOFEN 10 MG/1
20 TABLET ORAL DAILY
Status: DISCONTINUED | OUTPATIENT
Start: 2020-12-29 | End: 2021-01-05 | Stop reason: HOSPADM

## 2020-12-29 RX ADMIN — HEPARIN SODIUM 5000 UNITS: 5000 INJECTION INTRAVENOUS; SUBCUTANEOUS at 10:12

## 2020-12-29 RX ADMIN — POTASSIUM CHLORIDE 10 MEQ: 7.46 INJECTION, SOLUTION INTRAVENOUS at 09:12

## 2020-12-29 RX ADMIN — POTASSIUM CHLORIDE 10 MEQ: 7.46 INJECTION, SOLUTION INTRAVENOUS at 08:12

## 2020-12-29 RX ADMIN — VASOPRESSIN 2 UNITS: 20 INJECTION INTRAVENOUS at 11:12

## 2020-12-29 RX ADMIN — FENTANYL CITRATE 100 MCG: 50 INJECTION, SOLUTION INTRAMUSCULAR; INTRAVENOUS at 11:12

## 2020-12-29 RX ADMIN — ROCURONIUM BROMIDE 10 MG: 10 INJECTION, SOLUTION INTRAVENOUS at 12:12

## 2020-12-29 RX ADMIN — EPHEDRINE SULFATE 10 MG: 50 INJECTION INTRAVENOUS at 11:12

## 2020-12-29 RX ADMIN — SUCCINYLCHOLINE CHLORIDE 120 MG: 20 INJECTION, SOLUTION INTRAMUSCULAR; INTRAVENOUS at 03:12

## 2020-12-29 RX ADMIN — ACETAMINOPHEN 976.6 MG: 160 SOLUTION ORAL at 09:12

## 2020-12-29 RX ADMIN — Medication 25 MG: at 11:12

## 2020-12-29 RX ADMIN — MUPIROCIN: 20 OINTMENT TOPICAL at 09:12

## 2020-12-29 RX ADMIN — AMITRIPTYLINE HYDROCHLORIDE 150 MG: 150 TABLET, FILM COATED ORAL at 09:12

## 2020-12-29 RX ADMIN — PROPOFOL 100 MG: 10 INJECTION, EMULSION INTRAVENOUS at 03:12

## 2020-12-29 RX ADMIN — POTASSIUM CHLORIDE 10 MEQ: 7.46 INJECTION, SOLUTION INTRAVENOUS at 10:12

## 2020-12-29 RX ADMIN — ALBUMIN (HUMAN): 12.5 SOLUTION INTRAVENOUS at 11:12

## 2020-12-29 RX ADMIN — ONDANSETRON 4 MG: 2 INJECTION, SOLUTION INTRAMUSCULAR; INTRAVENOUS at 11:12

## 2020-12-29 RX ADMIN — ROCURONIUM BROMIDE 50 MG: 10 INJECTION, SOLUTION INTRAVENOUS at 11:12

## 2020-12-29 RX ADMIN — FLUDROCORTISONE ACETATE 100 MCG: 0.1 TABLET ORAL at 05:12

## 2020-12-29 RX ADMIN — ALBUTEROL SULFATE 2 PUFF: 90 AEROSOL, METERED RESPIRATORY (INHALATION) at 02:12

## 2020-12-29 RX ADMIN — BACLOFEN 30 MG: 10 TABLET ORAL at 09:12

## 2020-12-29 RX ADMIN — PHENYLEPHRINE HYDROCHLORIDE 100 MCG: 10 INJECTION INTRAVENOUS at 11:12

## 2020-12-29 RX ADMIN — Medication 10 MG: at 01:12

## 2020-12-29 RX ADMIN — SUGAMMADEX 200 MG: 100 INJECTION, SOLUTION INTRAVENOUS at 02:12

## 2020-12-29 RX ADMIN — SODIUM CHLORIDE, SODIUM LACTATE, POTASSIUM CHLORIDE, AND CALCIUM CHLORIDE: .6; .31; .03; .02 INJECTION, SOLUTION INTRAVENOUS at 03:12

## 2020-12-29 RX ADMIN — SODIUM CHLORIDE 10 ML/HR: 0.9 INJECTION, SOLUTION INTRAVENOUS at 09:12

## 2020-12-29 RX ADMIN — ACETAMINOPHEN 1000 MG: 10 INJECTION, SOLUTION INTRAVENOUS at 09:12

## 2020-12-29 RX ADMIN — METRONIDAZOLE 500 MG: 500 SOLUTION INTRAVENOUS at 11:12

## 2020-12-29 RX ADMIN — VASOPRESSIN 1 UNITS: 20 INJECTION INTRAVENOUS at 12:12

## 2020-12-29 RX ADMIN — SODIUM CHLORIDE 500 ML: 0.9 INJECTION, SOLUTION INTRAVENOUS at 01:12

## 2020-12-29 RX ADMIN — GABAPENTIN 600 MG: 250 SOLUTION ORAL at 09:12

## 2020-12-29 RX ADMIN — NICARDIPINE HYDROCHLORIDE 0.2 MG: 2.5 INJECTION INTRAVENOUS at 01:12

## 2020-12-29 RX ADMIN — IBUPROFEN 600 MG: 100 SUSPENSION ORAL at 09:12

## 2020-12-29 RX ADMIN — CEFTRIAXONE SODIUM 2 G: 2 INJECTION, SOLUTION INTRAVENOUS at 11:12

## 2020-12-29 RX ADMIN — LEVETIRACETAM 1000 MG: 500 SOLUTION ORAL at 09:12

## 2020-12-29 RX ADMIN — POTASSIUM CHLORIDE 10 MEQ: 7.46 INJECTION, SOLUTION INTRAVENOUS at 06:12

## 2020-12-29 RX ADMIN — SODIUM CHLORIDE 500 ML: 0.9 INJECTION, SOLUTION INTRAVENOUS at 11:12

## 2020-12-29 RX ADMIN — DEXAMETHASONE SODIUM PHOSPHATE 8 MG: 4 INJECTION, SOLUTION INTRAMUSCULAR; INTRAVENOUS at 11:12

## 2020-12-29 RX ADMIN — NEOSTIGMINE METHYLSULFATE 5 MG: 0.5 INJECTION INTRAVENOUS at 02:12

## 2020-12-29 RX ADMIN — Medication: at 03:12

## 2020-12-29 RX ADMIN — POTASSIUM CHLORIDE 10 MEQ: 7.46 INJECTION, SOLUTION INTRAVENOUS at 11:12

## 2020-12-29 RX ADMIN — FENTANYL CITRATE 50 MCG: 50 INJECTION, SOLUTION INTRAMUSCULAR; INTRAVENOUS at 01:12

## 2020-12-29 RX ADMIN — VASOPRESSIN 1 UNITS: 20 INJECTION INTRAVENOUS at 02:12

## 2020-12-29 RX ADMIN — ROCURONIUM BROMIDE 10 MG: 10 INJECTION, SOLUTION INTRAVENOUS at 01:12

## 2020-12-29 RX ADMIN — FAMOTIDINE 20 MG: 10 INJECTION, SOLUTION INTRAVENOUS at 11:12

## 2020-12-29 RX ADMIN — POTASSIUM BICARBONATE 25 MEQ: 978 TABLET, EFFERVESCENT ORAL at 05:12

## 2020-12-29 RX ADMIN — PROPOFOL 50 MG: 10 INJECTION, EMULSION INTRAVENOUS at 11:12

## 2020-12-29 RX ADMIN — VASOPRESSIN 1 UNITS: 20 INJECTION INTRAVENOUS at 01:12

## 2020-12-29 RX ADMIN — MIDAZOLAM HYDROCHLORIDE 2 MG: 1 INJECTION, SOLUTION INTRAMUSCULAR; INTRAVENOUS at 11:12

## 2020-12-29 RX ADMIN — PROPOFOL 125 MG: 10 INJECTION, EMULSION INTRAVENOUS at 11:12

## 2020-12-29 RX ADMIN — BACLOFEN 20 MG: 10 TABLET ORAL at 05:12

## 2020-12-29 NOTE — ANESTHESIA PROCEDURE NOTES
Intubation  Performed by: Mary Velazquez CRNA  Authorized by: Bert Cole MD     Intubation:     Induction:  Intravenous    Intubated:  Postinduction    Mask Ventilation:  Easy mask    Attempts:  1    Attempted By:  CRNA    Method of Intubation:  Video laryngoscopy    Blade:  Majo 3    Laryngeal View Grade: Grade I - full view of chords      Difficult Airway Encountered?: No      Complications:  None    Airway Device:  Oral endotracheal tube    Airway Device Size:  7.0    Style/Cuff Inflation:  Cuffed (inflated to minimal occlusive pressure)    Inflation Amount (mL):  6    Tube secured:  21    Secured at:  The lips    Placement Verified By:  Capnometry    Complicating Factors:  None    Findings Post-Intubation:  BS equal bilateral and atraumatic/condition of teeth unchanged

## 2020-12-29 NOTE — ANESTHESIA PREPROCEDURE EVALUATION
12/29/2020  Ifeanyi Bunn is a 60 y.o., male. Scheduled for laparoscopic colectomy. 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 He has required multiple cervical spine surgeries and currently has good mobility. He and his wife claim they have never been told he is a difficult intubation. He follows up regularly with multiple doctores who all say he is in good health. He does have a history of dysautonomia for which we will place an arterial line for    Anesthesia Evaluation    I have reviewed the Patient Summary Reports.    I have reviewed the Nursing Notes. I have reviewed the NPO Status.   I have reviewed the Medications.     Review of Systems  Anesthesia Hx:   Denies Personal Hx of Anesthesia complications.   Cardiovascular:   Hypertension Denies MI.  Denies CAD.    Denies Dysrhythmias.  ECG has been reviewed.    Pulmonary:   Denies COPD.  Denies Asthma.  Denies Shortness of breath.  Denies Sleep Apnea.    Renal/:   Chronic Renal Disease, CRI    Hepatic/GI:   GERD Denies Liver Disease.    Neurological:   Denies TIA. CVA Seizures    Endocrine:   Diabetes Denies Hypothyroidism. Denies Hyperthyroidism.    Psych:   depression          Physical Exam  General:  Well nourished    Airway/Jaw/Neck:  Airway Findings: Mouth Opening: Normal Tongue: Normal  General Airway Assessment: Adult  Mallampati: II  TM Distance: Normal, at least 6 cm     Eyes/Ears/Nose:  EYES/EARS/NOSE FINDINGS: Normal   Dental:  Dental Findings: In tact        Mental Status:  Mental Status Findings: Normal        Anesthesia Plan  Type of Anesthesia, risks & benefits discussed:  Anesthesia Type:  general  Patient's Preference:   Intra-op Monitoring Plan: standard ASA monitors  Intra-op Monitoring Plan Comments:   Post Op Pain Control  Plan: multimodal analgesia and IV/PO Opioids PRN  Post Op Pain Control Plan Comments:   Induction:   IV  Beta Blocker:  Patient is not currently on a Beta-Blocker (No further documentation required).       Informed Consent: Patient understands risks and agrees with Anesthesia plan.  Questions answered. Anesthesia consent signed with patient.  ASA Score: 2     Day of Surgery Review of History & Physical:            Ready For Surgery From Anesthesia Perspective.

## 2020-12-29 NOTE — ANESTHESIA POSTPROCEDURE EVALUATION
Anesthesia Post Evaluation    Patient: Ifeanyi Bunn    Procedure(s) Performed: Procedure(s) (LRB):  COLECTOMY, RIGHT, LAPAROSCOPIC, ERAS low (Right)    Final Anesthesia Type: general      Patient location during evaluation: PACU  Patient participation: Yes- Able to Participate  Level of consciousness: awake and alert  Post-procedure vital signs: reviewed and stable  Pain management: adequate  Airway patency: patent    PONV status at discharge: No PONV  Anesthetic complications: no      Cardiovascular status: blood pressure returned to baseline  Respiratory status: unassisted and spontaneous ventilation  Hydration status: euvolemic  Follow-up not needed.          Vitals Value Taken Time   /80 12/29/20 1719   Temp 36.6 °C (97.8 °F) 12/29/20 1719   Pulse 89 12/29/20 1719   Resp 16 12/29/20 1719   SpO2 92 % 12/29/20 1719         Event Time   Out of Recovery 16:15:00         Pain/Arina Score: Pain Rating Prior to Med Admin: 0 (12/29/2020  3:30 PM)  Arina Score: 10 (12/29/2020  4:15 PM)    Patient initially arrived to the PACU intubated. Read intra-op quick note for more details. After a short period of time, patient met extubation criteria and tolerated extubation well. He is now stable for transfer to the floor.

## 2020-12-29 NOTE — TRANSFER OF CARE
"Anesthesia Transfer of Care Note    Patient: Ifeanyi Bunn    Procedure(s) Performed: Procedure(s) (LRB):  COLECTOMY, RIGHT, LAPAROSCOPIC, ERAS low (Right)    Patient location: PACU    Anesthesia Type: general    Transport from OR: Transported from OR intubated on 100% O2 by AMBU with adequate controlled ventilation    Post pain: adequate analgesia    Post assessment: no apparent anesthetic complications and tolerated procedure well    Post vital signs: stable    Level of consciousness: sedated    Nausea/Vomiting: no nausea/vomiting    Complications: none    Transfer of care protocol was followed      Last vitals:   Visit Vitals  /71 (BP Location: Right arm, Patient Position: Lying)   Pulse 79   Temp 36.9 °C (98.4 °F) (Temporal)   Resp 15   Ht 6' 2" (1.88 m)   Wt 82.6 kg (182 lb)   SpO2 100%   BMI 23.37 kg/m²     "

## 2020-12-29 NOTE — ANESTHESIA PROCEDURE NOTES
Arterial    Diagnosis: colon cancer    Patient location during procedure: done in OR  Procedure start time: 12/29/2020 11:18 AM  Timeout: 12/29/2020 11:15 AM  Procedure end time: 12/29/2020 11:25 AM    Staffing  Authorizing Provider: Bert Cole MD  Performing Provider: Bert Cole MD      Preanesthetic Checklist  Completed: patient identified, surgical consent, pre-op evaluation, timeout performed, IV checked, risks and benefits discussed, monitors and equipment checked and anesthesia consent givenArterial  Skin Prep: chlorhexidine gluconate and isopropyl alcohol  Local Infiltration: none  Orientation: left  Location: radial  Catheter Size: 20 G  Catheter placement by Anatomical landmarks. Heme positive aspiration all ports.Insertion Attempts: 1  Assessment  Dressing: secured with tape and tegaderm  Patient: Tolerated well
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
## 2020-12-30 PROBLEM — R33.9 URINARY RETENTION: Status: ACTIVE | Noted: 2020-12-30

## 2020-12-30 LAB
ANION GAP SERPL CALC-SCNC: 11 MMOL/L (ref 8–16)
BASOPHILS # BLD AUTO: 0.01 K/UL (ref 0–0.2)
BASOPHILS NFR BLD: 0.1 % (ref 0–1.9)
BUN SERPL-MCNC: 15 MG/DL (ref 8–23)
CALCIUM SERPL-MCNC: 9.1 MG/DL (ref 8.7–10.5)
CHLORIDE SERPL-SCNC: 102 MMOL/L (ref 95–110)
CO2 SERPL-SCNC: 28 MMOL/L (ref 23–29)
CREAT SERPL-MCNC: 1.5 MG/DL (ref 0.5–1.4)
DIFFERENTIAL METHOD: ABNORMAL
EOSINOPHIL # BLD AUTO: 0 K/UL (ref 0–0.5)
EOSINOPHIL NFR BLD: 0 % (ref 0–8)
ERYTHROCYTE [DISTWIDTH] IN BLOOD BY AUTOMATED COUNT: 12.3 % (ref 11.5–14.5)
EST. GFR  (AFRICAN AMERICAN): 56.9 ML/MIN/1.73 M^2
EST. GFR  (NON AFRICAN AMERICAN): 49.2 ML/MIN/1.73 M^2
GLUCOSE SERPL-MCNC: 98 MG/DL (ref 70–110)
HCT VFR BLD AUTO: 35.9 % (ref 40–54)
HGB BLD-MCNC: 11.1 G/DL (ref 14–18)
IMM GRANULOCYTES # BLD AUTO: 0.02 K/UL (ref 0–0.04)
IMM GRANULOCYTES NFR BLD AUTO: 0.2 % (ref 0–0.5)
LYMPHOCYTES # BLD AUTO: 0.6 K/UL (ref 1–4.8)
LYMPHOCYTES NFR BLD: 5.8 % (ref 18–48)
MAGNESIUM SERPL-MCNC: 2.3 MG/DL (ref 1.6–2.6)
MCH RBC QN AUTO: 28.3 PG (ref 27–31)
MCHC RBC AUTO-ENTMCNC: 30.9 G/DL (ref 32–36)
MCV RBC AUTO: 92 FL (ref 82–98)
MONOCYTES # BLD AUTO: 0.6 K/UL (ref 0.3–1)
MONOCYTES NFR BLD: 6.5 % (ref 4–15)
NEUTROPHILS # BLD AUTO: 8.4 K/UL (ref 1.8–7.7)
NEUTROPHILS NFR BLD: 87.4 % (ref 38–73)
NRBC BLD-RTO: 0 /100 WBC
PHOSPHATE SERPL-MCNC: 3.7 MG/DL (ref 2.7–4.5)
PLATELET # BLD AUTO: 197 K/UL (ref 150–350)
PMV BLD AUTO: 10.8 FL (ref 9.2–12.9)
POTASSIUM SERPL-SCNC: 3.8 MMOL/L (ref 3.5–5.1)
RBC # BLD AUTO: 3.92 M/UL (ref 4.6–6.2)
SODIUM SERPL-SCNC: 141 MMOL/L (ref 136–145)
WBC # BLD AUTO: 9.63 K/UL (ref 3.9–12.7)

## 2020-12-30 PROCEDURE — 51798 US URINE CAPACITY MEASURE: CPT

## 2020-12-30 PROCEDURE — 36415 COLL VENOUS BLD VENIPUNCTURE: CPT

## 2020-12-30 PROCEDURE — 25000003 PHARM REV CODE 250: Performed by: SURGERY

## 2020-12-30 PROCEDURE — 94660 CPAP INITIATION&MGMT: CPT

## 2020-12-30 PROCEDURE — 94761 N-INVAS EAR/PLS OXIMETRY MLT: CPT

## 2020-12-30 PROCEDURE — 85025 COMPLETE CBC W/AUTO DIFF WBC: CPT

## 2020-12-30 PROCEDURE — 80048 BASIC METABOLIC PNL TOTAL CA: CPT

## 2020-12-30 PROCEDURE — 27000190 HC CPAP FULL FACE MASK W/VALVE

## 2020-12-30 PROCEDURE — 97530 THERAPEUTIC ACTIVITIES: CPT

## 2020-12-30 PROCEDURE — 83735 ASSAY OF MAGNESIUM: CPT

## 2020-12-30 PROCEDURE — 63600175 PHARM REV CODE 636 W HCPCS: Performed by: SURGERY

## 2020-12-30 PROCEDURE — 97166 OT EVAL MOD COMPLEX 45 MIN: CPT

## 2020-12-30 PROCEDURE — 84100 ASSAY OF PHOSPHORUS: CPT

## 2020-12-30 PROCEDURE — 94770 HC EXHALED C02 TEST: CPT

## 2020-12-30 PROCEDURE — 97162 PT EVAL MOD COMPLEX 30 MIN: CPT

## 2020-12-30 PROCEDURE — 20600001 HC STEP DOWN PRIVATE ROOM

## 2020-12-30 PROCEDURE — 99900035 HC TECH TIME PER 15 MIN (STAT)

## 2020-12-30 RX ORDER — TAMSULOSIN HYDROCHLORIDE 0.4 MG/1
0.4 CAPSULE ORAL DAILY
Status: DISCONTINUED | OUTPATIENT
Start: 2020-12-31 | End: 2020-12-31

## 2020-12-30 RX ORDER — TAMSULOSIN HYDROCHLORIDE 0.4 MG/1
0.4 CAPSULE ORAL DAILY
Status: DISCONTINUED | OUTPATIENT
Start: 2020-12-30 | End: 2020-12-30

## 2020-12-30 RX ORDER — ACETAMINOPHEN 650 MG/20.3ML
1000 LIQUID ORAL EVERY 8 HOURS
Status: DISCONTINUED | OUTPATIENT
Start: 2020-12-31 | End: 2021-01-05 | Stop reason: HOSPADM

## 2020-12-30 RX ADMIN — ACETAMINOPHEN 1000 MG: 10 INJECTION, SOLUTION INTRAVENOUS at 03:12

## 2020-12-30 RX ADMIN — HEPARIN SODIUM 5000 UNITS: 5000 INJECTION INTRAVENOUS; SUBCUTANEOUS at 03:12

## 2020-12-30 RX ADMIN — POTASSIUM BICARBONATE 25 MEQ: 978 TABLET, EFFERVESCENT ORAL at 09:12

## 2020-12-30 RX ADMIN — BACLOFEN 20 MG: 10 TABLET ORAL at 09:12

## 2020-12-30 RX ADMIN — SODIUM CHLORIDE, SODIUM LACTATE, POTASSIUM CHLORIDE, AND CALCIUM CHLORIDE: .6; .31; .03; .02 INJECTION, SOLUTION INTRAVENOUS at 03:12

## 2020-12-30 RX ADMIN — BACLOFEN 30 MG: 10 TABLET ORAL at 10:12

## 2020-12-30 RX ADMIN — HEPARIN SODIUM 5000 UNITS: 5000 INJECTION INTRAVENOUS; SUBCUTANEOUS at 09:12

## 2020-12-30 RX ADMIN — GABAPENTIN 600 MG: 250 SOLUTION ORAL at 10:12

## 2020-12-30 RX ADMIN — FLUDROCORTISONE ACETATE 100 MCG: 0.1 TABLET ORAL at 09:12

## 2020-12-30 RX ADMIN — AMITRIPTYLINE HYDROCHLORIDE 150 MG: 150 TABLET, FILM COATED ORAL at 10:12

## 2020-12-30 RX ADMIN — MUPIROCIN: 20 OINTMENT TOPICAL at 10:12

## 2020-12-30 RX ADMIN — LEVETIRACETAM 1000 MG: 500 SOLUTION ORAL at 09:12

## 2020-12-30 RX ADMIN — ACETAMINOPHEN 1000 MG: 10 INJECTION, SOLUTION INTRAVENOUS at 06:12

## 2020-12-30 RX ADMIN — AMLODIPINE BESYLATE 5 MG: 5 TABLET ORAL at 09:12

## 2020-12-30 RX ADMIN — ACETAMINOPHEN 1000 MG: 500 TABLET ORAL at 10:12

## 2020-12-30 RX ADMIN — GABAPENTIN 600 MG: 250 SOLUTION ORAL at 03:12

## 2020-12-30 RX ADMIN — GABAPENTIN 600 MG: 250 SOLUTION ORAL at 06:12

## 2020-12-30 RX ADMIN — HEPARIN SODIUM 5000 UNITS: 5000 INJECTION INTRAVENOUS; SUBCUTANEOUS at 10:12

## 2020-12-30 RX ADMIN — LEVETIRACETAM 1000 MG: 500 SOLUTION ORAL at 10:12

## 2020-12-30 RX ADMIN — FLUOXETINE HYDROCHLORIDE 60 MG: 20 SOLUTION ORAL at 03:12

## 2020-12-30 RX ADMIN — MUPIROCIN: 20 OINTMENT TOPICAL at 09:12

## 2020-12-31 LAB
ANION GAP SERPL CALC-SCNC: 7 MMOL/L (ref 8–16)
BASOPHILS # BLD AUTO: 0.02 K/UL (ref 0–0.2)
BASOPHILS NFR BLD: 0.3 % (ref 0–1.9)
BUN SERPL-MCNC: 11 MG/DL (ref 8–23)
CALCIUM SERPL-MCNC: 8.5 MG/DL (ref 8.7–10.5)
CHLORIDE SERPL-SCNC: 101 MMOL/L (ref 95–110)
CO2 SERPL-SCNC: 32 MMOL/L (ref 23–29)
CREAT SERPL-MCNC: 1 MG/DL (ref 0.5–1.4)
DIFFERENTIAL METHOD: ABNORMAL
EOSINOPHIL # BLD AUTO: 0.1 K/UL (ref 0–0.5)
EOSINOPHIL NFR BLD: 1.3 % (ref 0–8)
ERYTHROCYTE [DISTWIDTH] IN BLOOD BY AUTOMATED COUNT: 12.5 % (ref 11.5–14.5)
EST. GFR  (AFRICAN AMERICAN): >60 ML/MIN/1.73 M^2
EST. GFR  (NON AFRICAN AMERICAN): >60 ML/MIN/1.73 M^2
GLUCOSE SERPL-MCNC: 80 MG/DL (ref 70–110)
HCT VFR BLD AUTO: 30.7 % (ref 40–54)
HGB BLD-MCNC: 9.5 G/DL (ref 14–18)
IMM GRANULOCYTES # BLD AUTO: 0.03 K/UL (ref 0–0.04)
IMM GRANULOCYTES NFR BLD AUTO: 0.5 % (ref 0–0.5)
LYMPHOCYTES # BLD AUTO: 0.8 K/UL (ref 1–4.8)
LYMPHOCYTES NFR BLD: 12.6 % (ref 18–48)
MCH RBC QN AUTO: 28.7 PG (ref 27–31)
MCHC RBC AUTO-ENTMCNC: 30.9 G/DL (ref 32–36)
MCV RBC AUTO: 93 FL (ref 82–98)
MONOCYTES # BLD AUTO: 0.4 K/UL (ref 0.3–1)
MONOCYTES NFR BLD: 7.3 % (ref 4–15)
NEUTROPHILS # BLD AUTO: 4.6 K/UL (ref 1.8–7.7)
NEUTROPHILS NFR BLD: 78 % (ref 38–73)
NRBC BLD-RTO: 0 /100 WBC
PLATELET # BLD AUTO: 159 K/UL (ref 150–350)
PMV BLD AUTO: 10 FL (ref 9.2–12.9)
POTASSIUM SERPL-SCNC: 3.5 MMOL/L (ref 3.5–5.1)
RBC # BLD AUTO: 3.31 M/UL (ref 4.6–6.2)
SODIUM SERPL-SCNC: 140 MMOL/L (ref 136–145)
WBC # BLD AUTO: 5.93 K/UL (ref 3.9–12.7)

## 2020-12-31 PROCEDURE — 36415 COLL VENOUS BLD VENIPUNCTURE: CPT

## 2020-12-31 PROCEDURE — 25000003 PHARM REV CODE 250: Performed by: COLON & RECTAL SURGERY

## 2020-12-31 PROCEDURE — 85025 COMPLETE CBC W/AUTO DIFF WBC: CPT

## 2020-12-31 PROCEDURE — 80048 BASIC METABOLIC PNL TOTAL CA: CPT

## 2020-12-31 PROCEDURE — 94660 CPAP INITIATION&MGMT: CPT

## 2020-12-31 PROCEDURE — 63600175 PHARM REV CODE 636 W HCPCS: Performed by: SURGERY

## 2020-12-31 PROCEDURE — 25000242 PHARM REV CODE 250 ALT 637 W/ HCPCS: Performed by: SURGERY

## 2020-12-31 PROCEDURE — 25000003 PHARM REV CODE 250: Performed by: SURGERY

## 2020-12-31 PROCEDURE — 99900035 HC TECH TIME PER 15 MIN (STAT)

## 2020-12-31 PROCEDURE — 20600001 HC STEP DOWN PRIVATE ROOM

## 2020-12-31 PROCEDURE — 27000190 HC CPAP FULL FACE MASK W/VALVE

## 2020-12-31 PROCEDURE — 94761 N-INVAS EAR/PLS OXIMETRY MLT: CPT

## 2020-12-31 RX ORDER — OXYCODONE HCL 5 MG/5 ML
5 SOLUTION, ORAL ORAL EVERY 4 HOURS PRN
Status: DISCONTINUED | OUTPATIENT
Start: 2020-12-31 | End: 2021-01-05 | Stop reason: HOSPADM

## 2020-12-31 RX ORDER — DEXTROSE MONOHYDRATE, SODIUM CHLORIDE, AND POTASSIUM CHLORIDE 50; 1.49; 4.5 G/1000ML; G/1000ML; G/1000ML
INJECTION, SOLUTION INTRAVENOUS CONTINUOUS
Status: DISCONTINUED | OUTPATIENT
Start: 2020-12-31 | End: 2021-01-02

## 2020-12-31 RX ORDER — OXYCODONE HCL 5 MG/5 ML
10 SOLUTION, ORAL ORAL EVERY 4 HOURS PRN
Status: DISCONTINUED | OUTPATIENT
Start: 2020-12-31 | End: 2021-01-05 | Stop reason: HOSPADM

## 2020-12-31 RX ADMIN — AMITRIPTYLINE HYDROCHLORIDE 150 MG: 150 TABLET, FILM COATED ORAL at 09:12

## 2020-12-31 RX ADMIN — BACLOFEN 20 MG: 10 TABLET ORAL at 08:12

## 2020-12-31 RX ADMIN — MUPIROCIN: 20 OINTMENT TOPICAL at 08:12

## 2020-12-31 RX ADMIN — ACETAMINOPHEN 999.01 MG: 160 SOLUTION ORAL at 03:12

## 2020-12-31 RX ADMIN — HEPARIN SODIUM 5000 UNITS: 5000 INJECTION INTRAVENOUS; SUBCUTANEOUS at 09:12

## 2020-12-31 RX ADMIN — HEPARIN SODIUM 5000 UNITS: 5000 INJECTION INTRAVENOUS; SUBCUTANEOUS at 06:12

## 2020-12-31 RX ADMIN — POTASSIUM CHLORIDE, DEXTROSE MONOHYDRATE AND SODIUM CHLORIDE 100 ML/HR: 150; 5; 450 INJECTION, SOLUTION INTRAVENOUS at 04:12

## 2020-12-31 RX ADMIN — AMLODIPINE BESYLATE 5 MG: 5 TABLET ORAL at 06:12

## 2020-12-31 RX ADMIN — POTASSIUM BICARBONATE 25 MEQ: 978 TABLET, EFFERVESCENT ORAL at 08:12

## 2020-12-31 RX ADMIN — GABAPENTIN 600 MG: 250 SOLUTION ORAL at 06:12

## 2020-12-31 RX ADMIN — ACETAMINOPHEN 999.01 MG: 160 SOLUTION ORAL at 09:12

## 2020-12-31 RX ADMIN — POTASSIUM CHLORIDE, DEXTROSE MONOHYDRATE AND SODIUM CHLORIDE 100 ML/HR: 150; 5; 450 INJECTION, SOLUTION INTRAVENOUS at 06:12

## 2020-12-31 RX ADMIN — FLUOXETINE HYDROCHLORIDE 60 MG: 20 SOLUTION ORAL at 09:12

## 2020-12-31 RX ADMIN — BACLOFEN 30 MG: 10 TABLET ORAL at 09:12

## 2020-12-31 RX ADMIN — FLUDROCORTISONE ACETATE 100 MCG: 0.1 TABLET ORAL at 08:12

## 2020-12-31 RX ADMIN — SODIUM CHLORIDE, SODIUM LACTATE, POTASSIUM CHLORIDE, AND CALCIUM CHLORIDE: .6; .31; .03; .02 INJECTION, SOLUTION INTRAVENOUS at 01:12

## 2020-12-31 RX ADMIN — OXYCODONE HYDROCHLORIDE 10 MG: 5 SOLUTION ORAL at 03:12

## 2020-12-31 RX ADMIN — GABAPENTIN 600 MG: 250 SOLUTION ORAL at 09:12

## 2020-12-31 RX ADMIN — LEVETIRACETAM 1000 MG: 500 SOLUTION ORAL at 09:12

## 2020-12-31 RX ADMIN — OXYCODONE HYDROCHLORIDE 10 MG: 5 SOLUTION ORAL at 09:12

## 2020-12-31 RX ADMIN — HEPARIN SODIUM 5000 UNITS: 5000 INJECTION INTRAVENOUS; SUBCUTANEOUS at 03:12

## 2020-12-31 RX ADMIN — ACETAMINOPHEN 999.01 MG: 160 SOLUTION ORAL at 06:12

## 2020-12-31 RX ADMIN — LEVETIRACETAM 1000 MG: 500 SOLUTION ORAL at 08:12

## 2020-12-31 RX ADMIN — MUPIROCIN: 20 OINTMENT TOPICAL at 09:12

## 2021-01-01 LAB
ANION GAP SERPL CALC-SCNC: 9 MMOL/L (ref 8–16)
BUN SERPL-MCNC: 9 MG/DL (ref 8–23)
CALCIUM SERPL-MCNC: 8.6 MG/DL (ref 8.7–10.5)
CHLORIDE SERPL-SCNC: 101 MMOL/L (ref 95–110)
CO2 SERPL-SCNC: 28 MMOL/L (ref 23–29)
CREAT SERPL-MCNC: 1 MG/DL (ref 0.5–1.4)
CRP SERPL-MCNC: 104.4 MG/L (ref 0–8.2)
EST. GFR  (AFRICAN AMERICAN): >60 ML/MIN/1.73 M^2
EST. GFR  (NON AFRICAN AMERICAN): >60 ML/MIN/1.73 M^2
GLUCOSE SERPL-MCNC: 88 MG/DL (ref 70–110)
POTASSIUM SERPL-SCNC: 3.6 MMOL/L (ref 3.5–5.1)
SODIUM SERPL-SCNC: 138 MMOL/L (ref 136–145)

## 2021-01-01 PROCEDURE — 99900035 HC TECH TIME PER 15 MIN (STAT)

## 2021-01-01 PROCEDURE — 20600001 HC STEP DOWN PRIVATE ROOM

## 2021-01-01 PROCEDURE — 25000242 PHARM REV CODE 250 ALT 637 W/ HCPCS: Performed by: SURGERY

## 2021-01-01 PROCEDURE — 25000003 PHARM REV CODE 250: Performed by: COLON & RECTAL SURGERY

## 2021-01-01 PROCEDURE — 80048 BASIC METABOLIC PNL TOTAL CA: CPT

## 2021-01-01 PROCEDURE — 27000221 HC OXYGEN, UP TO 24 HOURS

## 2021-01-01 PROCEDURE — 25000003 PHARM REV CODE 250: Performed by: SURGERY

## 2021-01-01 PROCEDURE — 25000003 PHARM REV CODE 250: Performed by: STUDENT IN AN ORGANIZED HEALTH CARE EDUCATION/TRAINING PROGRAM

## 2021-01-01 PROCEDURE — 36415 COLL VENOUS BLD VENIPUNCTURE: CPT

## 2021-01-01 PROCEDURE — 86140 C-REACTIVE PROTEIN: CPT

## 2021-01-01 PROCEDURE — 94761 N-INVAS EAR/PLS OXIMETRY MLT: CPT

## 2021-01-01 PROCEDURE — 63600175 PHARM REV CODE 636 W HCPCS: Performed by: SURGERY

## 2021-01-01 PROCEDURE — 94660 CPAP INITIATION&MGMT: CPT

## 2021-01-01 RX ORDER — POLYETHYLENE GLYCOL 3350 17 G/17G
17 POWDER, FOR SOLUTION ORAL DAILY
Status: DISCONTINUED | OUTPATIENT
Start: 2021-01-01 | End: 2021-01-05 | Stop reason: HOSPADM

## 2021-01-01 RX ADMIN — HEPARIN SODIUM 5000 UNITS: 5000 INJECTION INTRAVENOUS; SUBCUTANEOUS at 07:01

## 2021-01-01 RX ADMIN — GABAPENTIN 600 MG: 250 SOLUTION ORAL at 03:01

## 2021-01-01 RX ADMIN — AMITRIPTYLINE HYDROCHLORIDE 150 MG: 150 TABLET, FILM COATED ORAL at 09:01

## 2021-01-01 RX ADMIN — POTASSIUM BICARBONATE 25 MEQ: 978 TABLET, EFFERVESCENT ORAL at 09:01

## 2021-01-01 RX ADMIN — OXYCODONE HYDROCHLORIDE 10 MG: 5 SOLUTION ORAL at 12:01

## 2021-01-01 RX ADMIN — BACLOFEN 30 MG: 10 TABLET ORAL at 09:01

## 2021-01-01 RX ADMIN — LEVETIRACETAM 1000 MG: 500 SOLUTION ORAL at 09:01

## 2021-01-01 RX ADMIN — GABAPENTIN 600 MG: 250 SOLUTION ORAL at 09:01

## 2021-01-01 RX ADMIN — ACETAMINOPHEN 999.01 MG: 160 SOLUTION ORAL at 09:01

## 2021-01-01 RX ADMIN — FLUDROCORTISONE ACETATE 100 MCG: 0.1 TABLET ORAL at 09:01

## 2021-01-01 RX ADMIN — BACLOFEN 20 MG: 10 TABLET ORAL at 09:01

## 2021-01-01 RX ADMIN — FLUOXETINE HYDROCHLORIDE 60 MG: 20 SOLUTION ORAL at 09:01

## 2021-01-01 RX ADMIN — MUPIROCIN: 20 OINTMENT TOPICAL at 09:01

## 2021-01-01 RX ADMIN — ACETAMINOPHEN 999.01 MG: 160 SOLUTION ORAL at 03:01

## 2021-01-01 RX ADMIN — POTASSIUM CHLORIDE, DEXTROSE MONOHYDRATE AND SODIUM CHLORIDE 100 ML/HR: 150; 5; 450 INJECTION, SOLUTION INTRAVENOUS at 01:01

## 2021-01-01 RX ADMIN — GABAPENTIN 600 MG: 250 SOLUTION ORAL at 07:01

## 2021-01-01 RX ADMIN — HEPARIN SODIUM 5000 UNITS: 5000 INJECTION INTRAVENOUS; SUBCUTANEOUS at 09:01

## 2021-01-01 RX ADMIN — AMLODIPINE BESYLATE 5 MG: 5 TABLET ORAL at 12:01

## 2021-01-01 RX ADMIN — HEPARIN SODIUM 5000 UNITS: 5000 INJECTION INTRAVENOUS; SUBCUTANEOUS at 03:01

## 2021-01-01 RX ADMIN — POLYETHYLENE GLYCOL 3350 17 G: 17 POWDER, FOR SOLUTION ORAL at 12:01

## 2021-01-01 RX ADMIN — ACETAMINOPHEN 999.01 MG: 160 SOLUTION ORAL at 06:01

## 2021-01-02 LAB
ANION GAP SERPL CALC-SCNC: 8 MMOL/L (ref 8–16)
BUN SERPL-MCNC: 8 MG/DL (ref 8–23)
CALCIUM SERPL-MCNC: 9.1 MG/DL (ref 8.7–10.5)
CHLORIDE SERPL-SCNC: 100 MMOL/L (ref 95–110)
CO2 SERPL-SCNC: 30 MMOL/L (ref 23–29)
CREAT SERPL-MCNC: 0.9 MG/DL (ref 0.5–1.4)
CRP SERPL-MCNC: 74.2 MG/L (ref 0–8.2)
EST. GFR  (AFRICAN AMERICAN): >60 ML/MIN/1.73 M^2
EST. GFR  (NON AFRICAN AMERICAN): >60 ML/MIN/1.73 M^2
GLUCOSE SERPL-MCNC: 84 MG/DL (ref 70–110)
POTASSIUM SERPL-SCNC: 3.1 MMOL/L (ref 3.5–5.1)
SODIUM SERPL-SCNC: 138 MMOL/L (ref 136–145)

## 2021-01-02 PROCEDURE — 99900035 HC TECH TIME PER 15 MIN (STAT)

## 2021-01-02 PROCEDURE — 94761 N-INVAS EAR/PLS OXIMETRY MLT: CPT

## 2021-01-02 PROCEDURE — 25000242 PHARM REV CODE 250 ALT 637 W/ HCPCS: Performed by: SURGERY

## 2021-01-02 PROCEDURE — 20600001 HC STEP DOWN PRIVATE ROOM

## 2021-01-02 PROCEDURE — 80048 BASIC METABOLIC PNL TOTAL CA: CPT

## 2021-01-02 PROCEDURE — 25000003 PHARM REV CODE 250: Performed by: COLON & RECTAL SURGERY

## 2021-01-02 PROCEDURE — 63600175 PHARM REV CODE 636 W HCPCS: Performed by: SURGERY

## 2021-01-02 PROCEDURE — 94660 CPAP INITIATION&MGMT: CPT

## 2021-01-02 PROCEDURE — 86140 C-REACTIVE PROTEIN: CPT

## 2021-01-02 PROCEDURE — 25000003 PHARM REV CODE 250: Performed by: SURGERY

## 2021-01-02 PROCEDURE — 36415 COLL VENOUS BLD VENIPUNCTURE: CPT

## 2021-01-02 PROCEDURE — 25000003 PHARM REV CODE 250: Performed by: STUDENT IN AN ORGANIZED HEALTH CARE EDUCATION/TRAINING PROGRAM

## 2021-01-02 RX ADMIN — FLUOXETINE HYDROCHLORIDE 60 MG: 20 SOLUTION ORAL at 10:01

## 2021-01-02 RX ADMIN — POLYETHYLENE GLYCOL 3350 17 G: 17 POWDER, FOR SOLUTION ORAL at 09:01

## 2021-01-02 RX ADMIN — ACETAMINOPHEN 999.01 MG: 160 SOLUTION ORAL at 09:01

## 2021-01-02 RX ADMIN — BACLOFEN 20 MG: 10 TABLET ORAL at 09:01

## 2021-01-02 RX ADMIN — HEPARIN SODIUM 5000 UNITS: 5000 INJECTION INTRAVENOUS; SUBCUTANEOUS at 09:01

## 2021-01-02 RX ADMIN — LEVETIRACETAM 1000 MG: 500 SOLUTION ORAL at 09:01

## 2021-01-02 RX ADMIN — LEVETIRACETAM 1000 MG: 500 SOLUTION ORAL at 08:01

## 2021-01-02 RX ADMIN — AMITRIPTYLINE HYDROCHLORIDE 150 MG: 150 TABLET, FILM COATED ORAL at 08:01

## 2021-01-02 RX ADMIN — GABAPENTIN 600 MG: 250 SOLUTION ORAL at 05:01

## 2021-01-02 RX ADMIN — FLUDROCORTISONE ACETATE 100 MCG: 0.1 TABLET ORAL at 09:01

## 2021-01-02 RX ADMIN — AMLODIPINE BESYLATE 5 MG: 5 TABLET ORAL at 07:01

## 2021-01-02 RX ADMIN — BACLOFEN 30 MG: 10 TABLET ORAL at 08:01

## 2021-01-02 RX ADMIN — ACETAMINOPHEN 999.01 MG: 160 SOLUTION ORAL at 05:01

## 2021-01-02 RX ADMIN — HEPARIN SODIUM 5000 UNITS: 5000 INJECTION INTRAVENOUS; SUBCUTANEOUS at 05:01

## 2021-01-02 RX ADMIN — OXYCODONE HYDROCHLORIDE 5 MG: 5 SOLUTION ORAL at 07:01

## 2021-01-02 RX ADMIN — TRAMADOL HYDROCHLORIDE 50 MG: 50 TABLET, FILM COATED ORAL at 08:01

## 2021-01-02 RX ADMIN — HEPARIN SODIUM 5000 UNITS: 5000 INJECTION INTRAVENOUS; SUBCUTANEOUS at 02:01

## 2021-01-02 RX ADMIN — MUPIROCIN: 20 OINTMENT TOPICAL at 09:01

## 2021-01-02 RX ADMIN — GABAPENTIN 600 MG: 250 SOLUTION ORAL at 02:01

## 2021-01-02 RX ADMIN — POTASSIUM BICARBONATE 25 MEQ: 978 TABLET, EFFERVESCENT ORAL at 09:01

## 2021-01-02 RX ADMIN — GABAPENTIN 600 MG: 250 SOLUTION ORAL at 09:01

## 2021-01-03 LAB
ANION GAP SERPL CALC-SCNC: 9 MMOL/L (ref 8–16)
BUN SERPL-MCNC: 10 MG/DL (ref 8–23)
CALCIUM SERPL-MCNC: 9.1 MG/DL (ref 8.7–10.5)
CHLORIDE SERPL-SCNC: 97 MMOL/L (ref 95–110)
CO2 SERPL-SCNC: 30 MMOL/L (ref 23–29)
CREAT SERPL-MCNC: 1 MG/DL (ref 0.5–1.4)
CRP SERPL-MCNC: 69.7 MG/L (ref 0–8.2)
EST. GFR  (AFRICAN AMERICAN): >60 ML/MIN/1.73 M^2
EST. GFR  (NON AFRICAN AMERICAN): >60 ML/MIN/1.73 M^2
GLUCOSE SERPL-MCNC: 86 MG/DL (ref 70–110)
POTASSIUM SERPL-SCNC: 3.2 MMOL/L (ref 3.5–5.1)
SODIUM SERPL-SCNC: 136 MMOL/L (ref 136–145)

## 2021-01-03 PROCEDURE — 99900035 HC TECH TIME PER 15 MIN (STAT)

## 2021-01-03 PROCEDURE — 25000003 PHARM REV CODE 250: Performed by: STUDENT IN AN ORGANIZED HEALTH CARE EDUCATION/TRAINING PROGRAM

## 2021-01-03 PROCEDURE — 94660 CPAP INITIATION&MGMT: CPT

## 2021-01-03 PROCEDURE — 86140 C-REACTIVE PROTEIN: CPT

## 2021-01-03 PROCEDURE — 36415 COLL VENOUS BLD VENIPUNCTURE: CPT

## 2021-01-03 PROCEDURE — 25000003 PHARM REV CODE 250: Performed by: SURGERY

## 2021-01-03 PROCEDURE — 20600001 HC STEP DOWN PRIVATE ROOM

## 2021-01-03 PROCEDURE — 63600175 PHARM REV CODE 636 W HCPCS: Performed by: SURGERY

## 2021-01-03 PROCEDURE — 25000003 PHARM REV CODE 250: Performed by: COLON & RECTAL SURGERY

## 2021-01-03 PROCEDURE — 94761 N-INVAS EAR/PLS OXIMETRY MLT: CPT

## 2021-01-03 PROCEDURE — 27000221 HC OXYGEN, UP TO 24 HOURS

## 2021-01-03 PROCEDURE — 80048 BASIC METABOLIC PNL TOTAL CA: CPT

## 2021-01-03 RX ADMIN — MUPIROCIN: 20 OINTMENT TOPICAL at 08:01

## 2021-01-03 RX ADMIN — POTASSIUM BICARBONATE 25 MEQ: 978 TABLET, EFFERVESCENT ORAL at 08:01

## 2021-01-03 RX ADMIN — LEVETIRACETAM 1000 MG: 500 SOLUTION ORAL at 08:01

## 2021-01-03 RX ADMIN — HEPARIN SODIUM 5000 UNITS: 5000 INJECTION INTRAVENOUS; SUBCUTANEOUS at 02:01

## 2021-01-03 RX ADMIN — AMITRIPTYLINE HYDROCHLORIDE 150 MG: 150 TABLET, FILM COATED ORAL at 09:01

## 2021-01-03 RX ADMIN — BACLOFEN 30 MG: 10 TABLET ORAL at 09:01

## 2021-01-03 RX ADMIN — ACETAMINOPHEN 999.01 MG: 160 SOLUTION ORAL at 09:01

## 2021-01-03 RX ADMIN — GABAPENTIN 600 MG: 250 SOLUTION ORAL at 02:01

## 2021-01-03 RX ADMIN — GABAPENTIN 600 MG: 250 SOLUTION ORAL at 09:01

## 2021-01-03 RX ADMIN — FLUOXETINE HYDROCHLORIDE 60 MG: 20 SOLUTION ORAL at 08:01

## 2021-01-03 RX ADMIN — FLUDROCORTISONE ACETATE 100 MCG: 0.1 TABLET ORAL at 08:01

## 2021-01-03 RX ADMIN — BACLOFEN 20 MG: 10 TABLET ORAL at 08:01

## 2021-01-03 RX ADMIN — POLYETHYLENE GLYCOL 3350 17 G: 17 POWDER, FOR SOLUTION ORAL at 08:01

## 2021-01-03 RX ADMIN — HEPARIN SODIUM 5000 UNITS: 5000 INJECTION INTRAVENOUS; SUBCUTANEOUS at 05:01

## 2021-01-03 RX ADMIN — ACETAMINOPHEN 999.01 MG: 160 SOLUTION ORAL at 05:01

## 2021-01-03 RX ADMIN — HEPARIN SODIUM 5000 UNITS: 5000 INJECTION INTRAVENOUS; SUBCUTANEOUS at 09:01

## 2021-01-03 RX ADMIN — LEVETIRACETAM 1000 MG: 500 SOLUTION ORAL at 09:01

## 2021-01-03 RX ADMIN — ACETAMINOPHEN 999.01 MG: 160 SOLUTION ORAL at 02:01

## 2021-01-03 RX ADMIN — GABAPENTIN 600 MG: 250 SOLUTION ORAL at 05:01

## 2021-01-04 LAB
ANION GAP SERPL CALC-SCNC: 11 MMOL/L (ref 8–16)
BUN SERPL-MCNC: 16 MG/DL (ref 8–23)
CALCIUM SERPL-MCNC: 9.2 MG/DL (ref 8.7–10.5)
CHLORIDE SERPL-SCNC: 100 MMOL/L (ref 95–110)
CO2 SERPL-SCNC: 29 MMOL/L (ref 23–29)
CREAT SERPL-MCNC: 1.1 MG/DL (ref 0.5–1.4)
CRP SERPL-MCNC: 52.3 MG/L (ref 0–8.2)
EST. GFR  (AFRICAN AMERICAN): >60 ML/MIN/1.73 M^2
EST. GFR  (NON AFRICAN AMERICAN): >60 ML/MIN/1.73 M^2
GLUCOSE SERPL-MCNC: 77 MG/DL (ref 70–110)
POTASSIUM SERPL-SCNC: 3.2 MMOL/L (ref 3.5–5.1)
SODIUM SERPL-SCNC: 140 MMOL/L (ref 136–145)

## 2021-01-04 PROCEDURE — 97530 THERAPEUTIC ACTIVITIES: CPT

## 2021-01-04 PROCEDURE — 94660 CPAP INITIATION&MGMT: CPT

## 2021-01-04 PROCEDURE — 25000003 PHARM REV CODE 250: Performed by: STUDENT IN AN ORGANIZED HEALTH CARE EDUCATION/TRAINING PROGRAM

## 2021-01-04 PROCEDURE — 25000003 PHARM REV CODE 250: Performed by: COLON & RECTAL SURGERY

## 2021-01-04 PROCEDURE — 25000003 PHARM REV CODE 250: Performed by: NURSE PRACTITIONER

## 2021-01-04 PROCEDURE — 80048 BASIC METABOLIC PNL TOTAL CA: CPT

## 2021-01-04 PROCEDURE — 63600175 PHARM REV CODE 636 W HCPCS: Performed by: SURGERY

## 2021-01-04 PROCEDURE — 99900035 HC TECH TIME PER 15 MIN (STAT)

## 2021-01-04 PROCEDURE — 20600001 HC STEP DOWN PRIVATE ROOM

## 2021-01-04 PROCEDURE — 25000003 PHARM REV CODE 250: Performed by: SURGERY

## 2021-01-04 PROCEDURE — 86140 C-REACTIVE PROTEIN: CPT

## 2021-01-04 PROCEDURE — 94799 UNLISTED PULMONARY SVC/PX: CPT

## 2021-01-04 PROCEDURE — 97116 GAIT TRAINING THERAPY: CPT | Mod: CQ

## 2021-01-04 RX ORDER — TALC
6 POWDER (GRAM) TOPICAL NIGHTLY PRN
Status: DISCONTINUED | OUTPATIENT
Start: 2021-01-04 | End: 2021-01-05 | Stop reason: HOSPADM

## 2021-01-04 RX ADMIN — ACETAMINOPHEN 999.01 MG: 160 SOLUTION ORAL at 01:01

## 2021-01-04 RX ADMIN — GABAPENTIN 600 MG: 250 SOLUTION ORAL at 09:01

## 2021-01-04 RX ADMIN — LEVETIRACETAM 1000 MG: 500 SOLUTION ORAL at 09:01

## 2021-01-04 RX ADMIN — POTASSIUM BICARBONATE 50 MEQ: 978 TABLET, EFFERVESCENT ORAL at 09:01

## 2021-01-04 RX ADMIN — HEPARIN SODIUM 5000 UNITS: 5000 INJECTION INTRAVENOUS; SUBCUTANEOUS at 06:01

## 2021-01-04 RX ADMIN — ACETAMINOPHEN 999.01 MG: 160 SOLUTION ORAL at 09:01

## 2021-01-04 RX ADMIN — GABAPENTIN 600 MG: 250 SOLUTION ORAL at 01:01

## 2021-01-04 RX ADMIN — ACETAMINOPHEN 999.01 MG: 160 SOLUTION ORAL at 06:01

## 2021-01-04 RX ADMIN — FLUOXETINE HYDROCHLORIDE 60 MG: 20 SOLUTION ORAL at 09:01

## 2021-01-04 RX ADMIN — GABAPENTIN 600 MG: 250 SOLUTION ORAL at 06:01

## 2021-01-04 RX ADMIN — MELATONIN TAB 3 MG 6 MG: 3 TAB at 11:01

## 2021-01-04 RX ADMIN — BACLOFEN 20 MG: 10 TABLET ORAL at 09:01

## 2021-01-04 RX ADMIN — BACLOFEN 30 MG: 10 TABLET ORAL at 09:01

## 2021-01-04 RX ADMIN — FLUDROCORTISONE ACETATE 100 MCG: 0.1 TABLET ORAL at 09:01

## 2021-01-04 RX ADMIN — AMITRIPTYLINE HYDROCHLORIDE 150 MG: 150 TABLET, FILM COATED ORAL at 09:01

## 2021-01-05 VITALS
DIASTOLIC BLOOD PRESSURE: 86 MMHG | BODY MASS INDEX: 23.36 KG/M2 | HEIGHT: 74 IN | RESPIRATION RATE: 18 BRPM | SYSTOLIC BLOOD PRESSURE: 138 MMHG | TEMPERATURE: 98 F | WEIGHT: 182 LBS | OXYGEN SATURATION: 100 % | HEART RATE: 71 BPM

## 2021-01-05 LAB
ANION GAP SERPL CALC-SCNC: 11 MMOL/L (ref 8–16)
BUN SERPL-MCNC: 15 MG/DL (ref 8–23)
CALCIUM SERPL-MCNC: 9.2 MG/DL (ref 8.7–10.5)
CHLORIDE SERPL-SCNC: 99 MMOL/L (ref 95–110)
CO2 SERPL-SCNC: 32 MMOL/L (ref 23–29)
CREAT SERPL-MCNC: 1.1 MG/DL (ref 0.5–1.4)
CRP SERPL-MCNC: 33.3 MG/L (ref 0–8.2)
EST. GFR  (AFRICAN AMERICAN): >60 ML/MIN/1.73 M^2
EST. GFR  (NON AFRICAN AMERICAN): >60 ML/MIN/1.73 M^2
GLUCOSE SERPL-MCNC: 75 MG/DL (ref 70–110)
POTASSIUM SERPL-SCNC: 3.6 MMOL/L (ref 3.5–5.1)
SODIUM SERPL-SCNC: 142 MMOL/L (ref 136–145)

## 2021-01-05 PROCEDURE — 25000003 PHARM REV CODE 250: Performed by: SURGERY

## 2021-01-05 PROCEDURE — 86140 C-REACTIVE PROTEIN: CPT

## 2021-01-05 PROCEDURE — 80048 BASIC METABOLIC PNL TOTAL CA: CPT

## 2021-01-05 PROCEDURE — 25000003 PHARM REV CODE 250: Performed by: COLON & RECTAL SURGERY

## 2021-01-05 PROCEDURE — 36415 COLL VENOUS BLD VENIPUNCTURE: CPT

## 2021-01-05 PROCEDURE — 25000003 PHARM REV CODE 250: Performed by: NURSE PRACTITIONER

## 2021-01-05 RX ORDER — POLYETHYLENE GLYCOL 3350 17 G/17G
17 POWDER, FOR SOLUTION ORAL DAILY
Qty: 510 G | Refills: 6 | Status: SHIPPED | OUTPATIENT
Start: 2021-01-05

## 2021-01-05 RX ORDER — OXYCODONE HCL 5 MG/5 ML
10 SOLUTION, ORAL ORAL EVERY 4 HOURS PRN
Qty: 250 ML | Refills: 0 | Status: SHIPPED | OUTPATIENT
Start: 2021-01-05

## 2021-01-05 RX ADMIN — POTASSIUM BICARBONATE 50 MEQ: 978 TABLET, EFFERVESCENT ORAL at 09:01

## 2021-01-05 RX ADMIN — BACLOFEN 20 MG: 10 TABLET ORAL at 09:01

## 2021-01-05 RX ADMIN — ACETAMINOPHEN 999.01 MG: 160 SOLUTION ORAL at 05:01

## 2021-01-05 RX ADMIN — FLUOXETINE HYDROCHLORIDE 60 MG: 20 SOLUTION ORAL at 09:01

## 2021-01-05 RX ADMIN — LEVETIRACETAM 1000 MG: 500 SOLUTION ORAL at 09:01

## 2021-01-05 RX ADMIN — FLUDROCORTISONE ACETATE 100 MCG: 0.1 TABLET ORAL at 09:01

## 2021-01-05 RX ADMIN — GABAPENTIN 600 MG: 250 SOLUTION ORAL at 05:01

## 2021-01-06 ENCOUNTER — PATIENT MESSAGE (OUTPATIENT)
Dept: SURGERY | Facility: CLINIC | Age: 61
End: 2021-01-06

## 2021-01-06 LAB
FINAL PATHOLOGIC DIAGNOSIS: NORMAL
GROSS: NORMAL
Lab: NORMAL

## 2021-01-13 ENCOUNTER — OFFICE VISIT (OUTPATIENT)
Dept: SURGERY | Facility: CLINIC | Age: 61
End: 2021-01-13
Attending: COLON & RECTAL SURGERY
Payer: MEDICARE

## 2021-01-13 DIAGNOSIS — Z98.890 POST-OPERATIVE STATE: Primary | ICD-10-CM

## 2021-01-13 PROCEDURE — 99024 PR POST-OP FOLLOW-UP VISIT: ICD-10-PCS | Mod: 95,,, | Performed by: COLON & RECTAL SURGERY

## 2021-01-13 PROCEDURE — 99024 POSTOP FOLLOW-UP VISIT: CPT | Mod: 95,,, | Performed by: COLON & RECTAL SURGERY

## (undated) DEVICE — ELECTRODE REM PLYHSV RETURN 9

## (undated) DEVICE — SEALER LIGASURE LAP 37CM 5MM

## (undated) DEVICE — APPLICATOR CHLORAPREP ORN 26ML

## (undated) DEVICE — DRAPE ABDOMINAL TIBURON 14X11

## (undated) DEVICE — TROCAR ENDOPATH XCEL 5X75MM

## (undated) DEVICE — MARKER SKIN STND TIP BLUE BARR

## (undated) DEVICE — CUTTER PROXIMATE BLUE 75MM

## (undated) DEVICE — SEE MEDLINE ITEM 152487

## (undated) DEVICE — TRAY FOLEY 16FR INFECTION CONT

## (undated) DEVICE — SEE MEDLINE ITEM 157144

## (undated) DEVICE — SEE MEDLINE ITEM 152622

## (undated) DEVICE — Device

## (undated) DEVICE — KIT ANTIFOG

## (undated) DEVICE — STAPLER INT PROX TX 60X3.5MM

## (undated) DEVICE — CLOSURE SKIN STERI STRIP 1/2X4

## (undated) DEVICE — TUBING HF INSUFFLATION W/ FLTR

## (undated) DEVICE — NDL BOX COUNTER

## (undated) DEVICE — SUT 3/0 27IN PDS II VIO MO

## (undated) DEVICE — TROCAR KII BLLN 12MM 10CM

## (undated) DEVICE — TROCAR ENDOPATH XCEL 5MM 7.5CM

## (undated) DEVICE — SEE MEDLINE ITEM 154981

## (undated) DEVICE — ADHESIVE DERMABOND ADVANCED

## (undated) DEVICE — COVER LIGHT HANDLE 80/CA

## (undated) DEVICE — SCISSOR 5MMX35CM DIRECT DRIVE

## (undated) DEVICE — NDL INSUF ULTRA VERESS 120MM

## (undated) DEVICE — SEE MEDLINE ITEM 146417

## (undated) DEVICE — SEE MEDLINE ITEM 156902

## (undated) DEVICE — KIT VUETIP TROCAR SWAB

## (undated) DEVICE — DRAPE INCISE IOBAN 2 23X17IN

## (undated) DEVICE — IRRIGATOR ENDOSCOPY DISP.

## (undated) DEVICE — SUT MONOCRYL 4-0 PS-2

## (undated) DEVICE — SUT 1 36IN PDS II